# Patient Record
Sex: FEMALE | Race: WHITE | NOT HISPANIC OR LATINO | URBAN - METROPOLITAN AREA
[De-identification: names, ages, dates, MRNs, and addresses within clinical notes are randomized per-mention and may not be internally consistent; named-entity substitution may affect disease eponyms.]

---

## 2018-10-20 ENCOUNTER — HOSPITAL ENCOUNTER (EMERGENCY)
Facility: HOSPITAL | Age: 68
Discharge: LEFT BEFORE DISCHARGE | End: 2018-10-20
Attending: EMERGENCY MEDICINE

## 2018-10-20 VITALS
HEART RATE: 83 BPM | TEMPERATURE: 98.7 F | RESPIRATION RATE: 16 BRPM | WEIGHT: 100 LBS | SYSTOLIC BLOOD PRESSURE: 145 MMHG | OXYGEN SATURATION: 98 % | DIASTOLIC BLOOD PRESSURE: 95 MMHG

## 2018-10-20 DIAGNOSIS — L03.211 FACIAL CELLULITIS: Primary | ICD-10-CM

## 2018-10-20 PROCEDURE — 99283 EMERGENCY DEPT VISIT LOW MDM: CPT

## 2018-10-20 RX ORDER — CLINDAMYCIN HYDROCHLORIDE 150 MG/1
300 CAPSULE ORAL 4 TIMES DAILY
Qty: 80 CAPSULE | Refills: 0 | Status: SHIPPED | OUTPATIENT
Start: 2018-10-20 | End: 2018-10-30

## 2018-10-20 NOTE — DISCHARGE INSTRUCTIONS
Cellulitis   AMBULATORY CARE:   Cellulitis  is a skin infection caused by bacteria  Cellulitis usually appears on the legs and feet, arms and hands, or face  Common signs and symptoms include the following:   · A red, warm, swollen area on your skin    · Pain when the area is touched    · Bumps or blisters (abscess) that may drain pus    · Bumpy, raised skin that feels like an orange peel  Call 911 if:   · You have sudden trouble breathing or chest pain  Seek care immediately if:   · Your wound gets larger and more painful  · You feel a crackling under your skin when you touch it  · You have purple dots or bumps on your skin, or you see bleeding under your skin  · You have new swelling and pain in your legs  · The red, warm, swollen area gets larger  · You see red streaks coming from the infected area  Contact your healthcare provider if:   · You have a fever  · Your fever or pain does not go away or gets worse  · The area does not get smaller after 2 days of antibiotics  · Your skin is flaking or peeling off  · You have questions or concerns about your condition or care  Treatment for cellulitis  may decrease symptoms, stop the infection from spreading, and cure the infection  Treatment depends on how severe your cellulitis is  Cellulitis may go away on its own  You may  instead need antibiotics to help treat the bacterial infection and medicines for pain  Your healthcare provider may draw a [place] around the edges of your cellulitis  If your cellulitis spreads, your healthcare provider will see it outside of the [place]  Manage your symptoms:   · Elevate the area above the level of your heart  as often as you can  This will help decrease swelling and pain  Prop the area on pillows or blankets to keep it elevated comfortably  · Clean the area daily until the wound scabs over  Gently wash the area with soap and water  Pat dry  Use dressings as directed       · Place cool or warm, wet cloths on the area as directed  Use clean cloths and clean water  Leave it on the area until the cloth is room temperature  Pat the area dry with a clean, dry cloth  The cloths may help decrease pain  Prevent cellulitis:   · Do not scratch bug bites or areas of injury  You increase your risk for cellulitis by scratching these areas  · Do not share personal items, such as towels, clothing, and razors  · Clean exercise equipment  with germ-killing  before and after you use it  · Wash your hands often  Use soap and water  Wash your hands after you use the bathroom, change a child's diapers, or sneeze  Wash your hands before you prepare or eat food  Use lotion to prevent dry, cracked skin  · Wear pressure stockings as directed  You may be told to wear the stockings if you have peripheral edema  The stockings improve blood flow and decrease swelling  · Treat athlete's foot  This can help prevent the spread of a bacterial skin infection  Follow up with your healthcare provider within 3 days, or as directed: Your healthcare provider will check if your cellulitis is getting better  You may need different medicine  Write down your questions so you remember to ask them during your visits  © 2017 [employer]  Information is for End User's use only and may not be sold, redistributed or otherwise used for commercial purposes  All illustrations and images included in CareNotes® are the copyrighted property of [employer]  or [place]  The above information is an  only  It is not intended as medical advice for individual conditions or treatments  Talk to your doctor, nurse or pharmacist before following any medical regimen to see if it is safe and effective for you

## 2018-10-20 NOTE — ED PROVIDER NOTES
History  Chief Complaint   Patient presents with    Facial Swelling     is under the care of dentist, on zithromax, had a root canal     The patient is a 78-year-old female who presents for evaluation facial swelling  The patient states that she had a root canal done due to having a dental abscess per her dentist on Monday  Khang Segun informed her that she had a lot of pus removed at that time  She did feel good initially afterwards however noted some swelling returning on Wednesday of this week  She presents today with persistent swelling and some facial pain along the left side cheek bone  She does report that she is allergic to all antibiotics except Zithromax  She has been taking this for the last week and it does not seem to be working  The dentist wanted start the patient on clindamycin however the patient was unsure if she was allergic to this medication  She denies any fevers or chills  She does report that she is rather anxious due to not having a cigarette and she is also noting that she is hypertensive due to having six cups of coffee before coming to the emergency room  She denies any respiratory distress or blurry vision  History provided by:  Patient      None       Past Medical History:   Diagnosis Date    Hiatal hernia        Past Surgical History:   Procedure Laterality Date     SECTION      HERNIA REPAIR         History reviewed  No pertinent family history  I have reviewed and agree with the history as documented  Social History   Substance Use Topics    Smoking status: Current Every Day Smoker     Packs/day: 1 00    Smokeless tobacco: Never Used    Alcohol use No        Review of Systems   Constitutional: Negative for activity change, appetite change and fatigue  HENT: Negative for nosebleeds, sneezing, sore throat, trouble swallowing and voice change  Eyes: Negative for photophobia, pain and visual disturbance     Respiratory: Negative for apnea, choking and stridor  Cardiovascular: Negative for palpitations and leg swelling  Gastrointestinal: Negative for anal bleeding and constipation  Endocrine: Negative for cold intolerance, heat intolerance, polydipsia and polyphagia  Genitourinary: Negative for decreased urine volume, enuresis, frequency, genital sores and urgency  Musculoskeletal: Negative for joint swelling and myalgias  Allergic/Immunologic: Negative for environmental allergies and food allergies  Neurological: Negative for tremors, seizures, speech difficulty and weakness  Hematological: Negative for adenopathy  Psychiatric/Behavioral: Negative for behavioral problems, decreased concentration, dysphoric mood and hallucinations  Physical Exam  Physical Exam   Constitutional: She is oriented to person, place, and time  She appears well-developed and well-nourished  No distress  HENT:   Head: Normocephalic and atraumatic  Right Ear: External ear normal    Left Ear: External ear normal    Nose: Nose normal    Mouth/Throat: Oropharynx is clear and moist    Eyes: Pupils are equal, round, and reactive to light  Conjunctivae and EOM are normal    Neck: Normal range of motion  Neck supple  Cardiovascular: Normal rate, regular rhythm and normal heart sounds  Exam reveals no gallop and no friction rub  No murmur heard  Pulmonary/Chest: Effort normal and breath sounds normal  No respiratory distress  She has no wheezes  Abdominal: Soft  Bowel sounds are normal    Neurological: She is alert and oriented to person, place, and time  No cranial nerve deficit  Skin: Skin is warm and dry  She is not diaphoretic  Psychiatric: She has a normal mood and affect  Her behavior is normal    Vitals reviewed        Vital Signs  ED Triage Vitals [10/20/18 0915]   Temperature Pulse Respirations Blood Pressure SpO2   98 7 °F (37 1 °C) 83 16 145/95 98 %      Temp Source Heart Rate Source Patient Position - Orthostatic VS BP Location FiO2 (%) Tympanic Monitor Sitting Right arm --      Pain Score       5           Vitals:    10/20/18 0915   BP: 145/95   Pulse: 83   Patient Position - Orthostatic VS: Sitting       Visual Acuity      ED Medications  Medications - No data to display    Diagnostic Studies  Results Reviewed     None                 No orders to display              Procedures  Procedures       Phone Contacts  ED Phone Contact    ED Course                               MDM  Number of Diagnoses or Management Options  Facial cellulitis:   Diagnosis management comments: The patient was recommended to have blood work and a scan of her face  The patient adamantly refuses any lab work and scans  She states that she does not want to pay for these tests  I reinforced to her that the infection could worsen and could kill her and she still refuses the scans and lab work  She does report that she will take an antibiotic  I offered her clindamycin of which she is unsure she is allergic to and states that she would like to take this because she believes that the Zithromax is not working any longer  She does report to me that she has an appointment on Tuesday to see her dentist and will keep this appointment  I encouraged her to return to the emergency room with any worsening of her symptoms and she expressed verbal understanding  She agrees to sign AMA form      CritCare Time    Disposition  Final diagnoses:   Facial cellulitis     Time reflects when diagnosis was documented in both MDM as applicable and the Disposition within this note     Time User Action Codes Description Comment    10/20/2018  9:51 AM Francis Bergman Add [[id number]] Facial cellulitis       ED Disposition     ED Disposition Condition Comment    AMA  Date: 10/20/2018  Patient: Fabián Cruz  Admitted: 10/20/2018  9:13 AM  Attending Provider: José Manuel Khanna MD    Fabián Cruz or her authorized caregiver has made the decision for the patient to leave the emergency department against the advice o f the emergency department staff  She or her authorized caregiver has been informed and understands the inherent risks, including death, worsening of infection  She or her authorized caregiver has decided to accept the responsibility for this decision  Jody Sandoval and all necessary parties have been advised that she may return for further evaluation or treatment  Her condition at time of discharge was stable  Jody Sandoval had current vital signs as follows:  /95 (BP Location: Right arm)    Pulse 83   Temp 98 7 °F (37 1 °C) (Tympanic)   Resp 16   Wt 45 4 kg (100 lb)         Follow-up Information     Follow up With Specialties Details Why Contact Info    Gianni Dodge DDS Oral Maxillofacial Surgery Schedule an appointment as soon as possible for a visit  Teresa Sauera [address]            Patient's Medications   Discharge Prescriptions    CLINDAMYCIN (CLEOCIN) 150 MG CAPSULE    Take 2 capsules (300 mg total) by mouth 4 (four) times a day for 10 days       Start Date: 10/20/2018End Date: 10/30/2018       Order Dose: 300 mg       Quantity: 80 capsule    Refills: 0     No discharge procedures on file      ED Provider  Electronically Signed by           Yudith Seymour PA-C  10/20/18 7859

## 2022-11-21 ENCOUNTER — NURSE TRIAGE (OUTPATIENT)
Dept: OTHER | Facility: OTHER | Age: 72
End: 2022-11-21

## 2022-11-22 ENCOUNTER — OFFICE VISIT (OUTPATIENT)
Dept: INTERNAL MEDICINE CLINIC | Facility: CLINIC | Age: 72
End: 2022-11-22

## 2022-11-22 VITALS
BODY MASS INDEX: 18.35 KG/M2 | WEIGHT: 91 LBS | SYSTOLIC BLOOD PRESSURE: 130 MMHG | HEIGHT: 59 IN | RESPIRATION RATE: 16 BRPM | OXYGEN SATURATION: 97 % | DIASTOLIC BLOOD PRESSURE: 80 MMHG | TEMPERATURE: 97.6 F | HEART RATE: 84 BPM

## 2022-11-22 DIAGNOSIS — M17.0 PRIMARY OSTEOARTHRITIS OF BOTH KNEES: ICD-10-CM

## 2022-11-22 DIAGNOSIS — J20.9 BRONCHITIS WITH ASTHMA, ACUTE: Primary | ICD-10-CM

## 2022-11-22 DIAGNOSIS — J45.909 BRONCHITIS WITH ASTHMA, ACUTE: Primary | ICD-10-CM

## 2022-11-22 DIAGNOSIS — H65.113 ACUTE MUCOID OTITIS MEDIA OF BOTH EARS: Primary | ICD-10-CM

## 2022-11-22 DIAGNOSIS — J20.9 ACUTE INFECTIVE TRACHEOBRONCHITIS: ICD-10-CM

## 2022-11-22 DIAGNOSIS — F17.200 SMOKING: ICD-10-CM

## 2022-11-22 DIAGNOSIS — E44.1 MILD PROTEIN-CALORIE MALNUTRITION (HCC): Primary | ICD-10-CM

## 2022-11-22 PROBLEM — IMO0001 SMOKING: Status: ACTIVE | Noted: 2022-11-22

## 2022-11-22 RX ORDER — AZITHROMYCIN 250 MG/1
250 TABLET, FILM COATED ORAL EVERY 24 HOURS
COMMUNITY
End: 2022-11-22 | Stop reason: SDUPTHER

## 2022-11-22 RX ORDER — AZITHROMYCIN 250 MG/1
TABLET, FILM COATED ORAL
Qty: 6 TABLET | Refills: 0 | Status: SHIPPED | OUTPATIENT
Start: 2022-11-22 | End: 2022-11-26

## 2022-11-22 RX ORDER — AZITHROMYCIN 250 MG/1
TABLET, FILM COATED ORAL
Qty: 6 TABLET | Refills: 0 | Status: SHIPPED | OUTPATIENT
Start: 2022-11-22 | End: 2022-11-27

## 2022-11-22 NOTE — TELEPHONE ENCOUNTER
Reason for Disposition  • Cough    Answer Assessment - Initial Assessment Questions  1  ONSET: "When did the cough begin?"       yesterday  2  SEVERITY: "How bad is the cough today?"       Mild-moderate  3  SPUTUM: "Describe the color of your sputum" (none, dry cough; clear, white, yellow, green)      Tint of yellow  5  DIFFICULTY BREATHING: "Are you having difficulty breathing?" If Yes, ask: "How bad is it?" (e g , mild, moderate, severe)     - MILD: No SOB at rest, mild SOB with walking, speaks normally in sentences, can lay down, no retractions, pulse < 100      - MODERATE: SOB at rest, SOB with minimal exertion and prefers to sit, cannot lie down flat, speaks in phrases, mild retractions, audible wheezing, pulse 100-120      - SEVERE: Very SOB at rest, speaks in single words, struggling to breathe, sitting hunched forward, retractions, pulse > 120       denies  6  FEVER: "Do you have a fever?" If Yes, ask: "What is your temperature, how was it measured, and when did it start?"      denies  10   OTHER SYMPTOMS: "Do you have any other symptoms?" (e g , runny nose, wheezing, chest pain)        denies    Protocols used: COUGH - ACUTE PRODUCTIVE-ADULT-

## 2022-11-22 NOTE — TELEPHONE ENCOUNTER
Regarding: Cough/ No Temperature  ----- Message from Cedric 64 sent at 11/21/2022  7:18 PM EST -----  "My friend has bronchitis and I was exposed  I think I have bronchitis   I am coughing and I never do, I don't have a temperature"

## 2022-11-22 NOTE — PATIENT INSTRUCTIONS
Acute Cough   WHAT YOU NEED TO KNOW:   An acute cough can last up to 3 weeks  Common causes of an acute cough include a cold, allergies, or a lung infection  DISCHARGE INSTRUCTIONS:   Return to the emergency department if:   You have trouble breathing or feel short of breath  You cough up blood, or you see blood in your mucus  You faint or feel weak or dizzy  You have chest pain when you cough or take a deep breath  You have new wheezing  Contact your healthcare provider if:   You have a fever  Your cough lasts longer than 4 weeks  Your symptoms do not improve with treatment  You have questions or concerns about your condition or care  Medicines:   Medicines  may be needed to stop the cough, decrease swelling in your airways, or help open your airways  Medicine may also be given to help you cough up mucus  Ask your healthcare provider what over-the-counter medicines you can take  If you have an infection caused by bacteria, you may need antibiotics  Take your medicine as directed  Contact your healthcare provider if you think your medicine is not helping or if you have side effects  Tell him or her if you are allergic to any medicine  Keep a list of the medicines, vitamins, and herbs you take  Include the amounts, and when and why you take them  Bring the list or the pill bottles to follow-up visits  Carry your medicine list with you in case of an emergency  Manage your symptoms:   Do not smoke and stay away from others who smoke  Nicotine and other chemicals in cigarettes and cigars can cause lung damage and make your cough worse  Ask your healthcare provider for information if you currently smoke and need help to quit  E-cigarettes or smokeless tobacco still contain nicotine  Talk to your healthcare provider before you use these products  Drink extra liquids as directed  Liquids will help thin and loosen mucus so you can cough it up   Liquids will also help prevent dehydration  Examples of good liquids to drink include water, fruit juice, and broth  Do not drink liquids that contain caffeine  Caffeine can increase your risk for dehydration  Ask your healthcare provider how much liquid to drink each day  Rest as directed  Do not do activities that make your cough worse, such as exercise  Use a humidifier or vaporizer  Use a cool mist humidifier or a vaporizer to increase air moisture in your home  This may make it easier for you to breathe and help decrease your cough  Eat 2 to 5 mL of honey 2 times each day  Honey can help thin mucus and decrease your cough  Use cough drops or lozenges  These can help decrease throat irritation and your cough  Follow up with your healthcare provider as directed:  Write down your questions so you remember to ask them during your visits  © Copyright [employer] 2022 Information is for End User's use only and may not be sold, redistributed or otherwise used for commercial purposes  All illustrations and images included in CareNotes® are the copyrighted property of [employer]  or [address] [place], [place] [address]norman Amos   The above information is an  only  It is not intended as medical advice for individual conditions or treatments  Talk to your doctor, nurse or pharmacist before following any medical regimen to see if it is safe and effective for you

## 2022-11-22 NOTE — PROGRESS NOTES
Dr Víctor Yang Office Visit Note  22     Nadiya Pickard 74 y o  female MRN: [id number]  : 1950    Assessment:     1  Mild protein-calorie malnutrition ([address] )  Assessment & Plan:  Malnutrition Findings:  18 3 BMI kg/m2  counseling done for to gain weight nutritional supplement and consult with nutritionist patient refused                                BMI Findings: Body mass index is 18 38 kg/m²  2  Acute infective tracheobronchitis  Assessment & Plan:  Coughing congested the smoke half a pack per day treated with Z-Silvino and over-the-counter cough medicine      3  Primary osteoarthritis of both knees  Assessment & Plan:  Use Tylenol or ibuprofen over-the-counter symptom controlled      4  Smoking  Assessment & Plan:  Counseling done for smoking cessation            Discussion Summary and Plan: Today's care plan and medications were reviewed with patient in detail and all their questions answered to their satisfaction  No chief complaint on file  Subjective:  Came in complaining coughing scanty yellowish expect duration denies any fever chills difficulty breathing patient does smoke half a pack per day noncompliant refuse a colonoscopy mammogram blood work DEXA scan     BMI Counseling: Body mass index is 18 38 kg/m²  The BMI is below normal  Patient advised to gain weight and dietary education for weight gain was provided  Patient referred to nutritionist  Rationale for BMI follow-up plan is due to patient being underweight  The following portions of the patient's history were reviewed and updated as appropriate: allergies, current medications, past family history, past medical history, past social history, past surgical history and problem list     Review of Systems   Constitutional: Positive for activity change and fatigue  Negative for appetite change, chills, diaphoresis, fever and unexpected weight change     HENT: Negative for congestion, dental problem, drooling, ear discharge, ear pain, facial swelling, hearing loss, mouth sores, nosebleeds, postnasal drip, rhinorrhea, sinus pressure, sneezing, sore throat, tinnitus, trouble swallowing and voice change  Eyes: Negative for photophobia, pain, discharge, redness, itching and visual disturbance  Respiratory: Positive for cough  Negative for apnea, choking, chest tightness, shortness of breath, wheezing and stridor  Cardiovascular: Negative for chest pain, palpitations and leg swelling  Gastrointestinal: Negative for abdominal distention, abdominal pain, anal bleeding, blood in stool, constipation, diarrhea, nausea, rectal pain and vomiting  Endocrine: Negative for cold intolerance, heat intolerance, polydipsia, polyphagia and polyuria  Genitourinary: Negative for decreased urine volume, difficulty urinating, dysuria, enuresis, flank pain, frequency, genital sores, hematuria and urgency  Musculoskeletal: Negative for arthralgias, back pain, gait problem, joint swelling, myalgias, neck pain and neck stiffness  Skin: Negative for color change, pallor, rash and wound  Allergic/Immunologic: Negative  Negative for environmental allergies, food allergies and immunocompromised state  Neurological: Negative for dizziness, tremors, seizures, syncope, facial asymmetry, speech difficulty, weakness, light-headedness, numbness and headaches  Psychiatric/Behavioral: Negative for agitation, behavioral problems, confusion, decreased concentration, dysphoric mood, hallucinations, self-injury, sleep disturbance and suicidal ideas  The patient is not nervous/anxious and is not hyperactive            Historical Information   Patient Active Problem List   Diagnosis   • Acute infective tracheobronchitis   • Smoking   • Primary osteoarthritis of both knees   • Mild protein-calorie malnutrition ([facility] [address] )     Past Medical History:   Diagnosis Date   • Hiatal hernia      Past Surgical History:   Procedure Laterality Date   •  SECTION     • HERNIA REPAIR       Social History     Substance and Sexual Activity   Alcohol Use No     Social History     Substance and Sexual Activity   Drug Use No     Social History     Tobacco Use   Smoking Status Every Day   • Packs/day: 1 00   • Types: Cigarettes   Smokeless Tobacco Never     History reviewed  No pertinent family history  Health Maintenance Due   Topic   • Hepatitis C Screening    • Hepatitis B Vaccine (1 of 3 - 3-dose series)   • COVID-19 Vaccine (1)   • Pneumococcal Vaccine: 65+ Years (1 - PCV)   • Depression Screening    • BMI: Followup Plan    • Annual Physical    • DTaP,Tdap,and Td Vaccines (1 - Tdap)   • Breast Cancer Screening: Mammogram    • Colorectal Cancer Screening    • Osteoporosis Screening    • Fall Risk    • Urinary Incontinence Screening    • Influenza Vaccine (1)      Meds/Allergies       Current Outpatient Medications:   •  azithromycin (ZITHROMAX) 250 mg tablet, Take two tablets the first day then take one daily for 4 more days, Disp: 6 tablet, Rfl: 0  •  azithromycin (Zithromax) 250 mg tablet, Take 2 tablets (500 mg total) by mouth daily for 1 day, THEN 1 tablet (250 mg total) daily for 4 days  , Disp: 6 tablet, Rfl: 0      Objective:    Vitals:   /80   Pulse 84   Temp 97 6 °F (36 4 °C)   Resp 16   Ht 4' 11" (1 499 m)   Wt 41 3 kg (91 lb)   SpO2 97%   BMI 18 38 kg/m²   Body mass index is 18 38 kg/m²  Vitals:    11/22/22 1406   Weight: 41 3 kg (91 lb)       Physical Exam  Constitutional:       General: She is not in acute distress  Appearance: She is well-developed and well-nourished  She is not ill-appearing, toxic-appearing, sickly-appearing or diaphoretic  Comments: Malnourished   HENT:      Head: Normocephalic and atraumatic  Right Ear: External ear normal       Left Ear: External ear normal       Nose: Nose normal       Mouth/Throat:      Mouth: Oropharynx is clear and moist       Pharynx: No oropharyngeal exudate     Eyes:      General: Lids are normal  Lids are everted, no foreign bodies appreciated  No scleral icterus  Right eye: No discharge  Left eye: No discharge  Extraocular Movements: EOM normal       Conjunctiva/sclera: Conjunctivae normal       Pupils: Pupils are equal, round, and reactive to light  Neck:      Thyroid: No thyromegaly  Vascular: Normal carotid pulses  No carotid bruit, hepatojugular reflux or JVD  Trachea: No tracheal tenderness or tracheal deviation  Cardiovascular:      Rate and Rhythm: Normal rate and regular rhythm  Pulses: Normal pulses and intact distal pulses  Heart sounds: Normal heart sounds  No murmur heard  No friction rub  No gallop  Pulmonary:      Effort: Pulmonary effort is normal  No respiratory distress  Breath sounds: No stridor  Rhonchi and rales present  No wheezing  Chest:      Chest wall: No tenderness  Abdominal:      General: Bowel sounds are normal  There is no distension or ascites  Palpations: Abdomen is soft  There is no mass  Tenderness: There is no abdominal tenderness  There is no guarding or rebound  Musculoskeletal:         General: No tenderness, deformity or edema  Normal range of motion  Cervical back: Normal range of motion and neck supple  No edema, erythema or rigidity  No spinous process tenderness or muscular tenderness  Normal range of motion  Lymphadenopathy:      Head:      Right side of head: No submental, submandibular, tonsillar, preauricular or posterior auricular adenopathy  Left side of head: No submental, submandibular, tonsillar, preauricular, posterior auricular or occipital adenopathy  Cervical: No cervical adenopathy  Right cervical: No superficial, deep or posterior cervical adenopathy  Left cervical: No superficial, deep or posterior cervical adenopathy  Upper Body:      Right upper body: No pectoral or lateral adenopathy        Left upper body: No pectoral or lateral adenopathy  Skin:     General: Skin is warm and dry  Coloration: Skin is not pale  Findings: No erythema or rash  Neurological:      Mental Status: She is alert and oriented to person, place, and time  Cranial Nerves: No cranial nerve deficit  Sensory: No sensory deficit  Motor: Motor strength is normal  Weakness present  No tremor, abnormal muscle tone or seizure activity  Coordination: She displays a negative Romberg sign  Coordination abnormal       Gait: Gait normal       Deep Tendon Reflexes: Reflexes are normal and symmetric  Reflexes normal    Psychiatric:         Mood and Affect: Mood and affect normal          Behavior: Behavior normal          Thought Content: Thought content normal          Judgment: Judgment normal          Lab Review   No visits with results within 2 Month(s) from this visit  Latest known visit with results is:   No results found for any previous visit  Patient Instructions   Acute Cough   WHAT YOU NEED TO KNOW:   An acute cough can last up to 3 weeks  Common causes of an acute cough include a cold, allergies, or a lung infection  DISCHARGE INSTRUCTIONS:   Return to the emergency department if:   · You have trouble breathing or feel short of breath  · You cough up blood, or you see blood in your mucus  · You faint or feel weak or dizzy  · You have chest pain when you cough or take a deep breath  · You have new wheezing  Contact your healthcare provider if:   · You have a fever  · Your cough lasts longer than 4 weeks  · Your symptoms do not improve with treatment  · You have questions or concerns about your condition or care  Medicines:   · Medicines  may be needed to stop the cough, decrease swelling in your airways, or help open your airways  Medicine may also be given to help you cough up mucus  Ask your healthcare provider what over-the-counter medicines you can take   If you have an infection caused by bacteria, you may need antibiotics  · Take your medicine as directed  Contact your healthcare provider if you think your medicine is not helping or if you have side effects  Tell him or her if you are allergic to any medicine  Keep a list of the medicines, vitamins, and herbs you take  Include the amounts, and when and why you take them  Bring the list or the pill bottles to follow-up visits  Carry your medicine list with you in case of an emergency  Manage your symptoms:   · Do not smoke and stay away from others who smoke  Nicotine and other chemicals in cigarettes and cigars can cause lung damage and make your cough worse  Ask your healthcare provider for information if you currently smoke and need help to quit  E-cigarettes or smokeless tobacco still contain nicotine  Talk to your healthcare provider before you use these products  · Drink extra liquids as directed  Liquids will help thin and loosen mucus so you can cough it up  Liquids will also help prevent dehydration  Examples of good liquids to drink include water, fruit juice, and broth  Do not drink liquids that contain caffeine  Caffeine can increase your risk for dehydration  Ask your healthcare provider how much liquid to drink each day  · Rest as directed  Do not do activities that make your cough worse, such as exercise  · Use a humidifier or vaporizer  Use a cool mist humidifier or a vaporizer to increase air moisture in your home  This may make it easier for you to breathe and help decrease your cough  · Eat 2 to 5 mL of honey 2 times each day  Honey can help thin mucus and decrease your cough  · Use cough drops or lozenges  These can help decrease throat irritation and your cough  Follow up with your healthcare provider as directed:  Write down your questions so you remember to ask them during your visits    © Copyright [employer] 2022 Information is for End User's use only and may not be sold, redistributed or otherwise used for commercial purposes  All illustrations and images included in CareNotes® are the copyrighted property of A D A M , Inc  or [facility] Maia Amos   The above information is an  only  It is not intended as medical advice for individual conditions or treatments  Talk to your doctor, nurse or pharmacist before following any medical regimen to see if it is safe and effective for you  Nena Mcbride MD        "This note has been constructed using a voice recognition system  Therefore there may be syntax, spelling, and/or grammatical errors   Please call if you have any questions  "

## 2022-11-22 NOTE — ASSESSMENT & PLAN NOTE
Malnutrition Findings:  18 3 BMI kg/m2  counseling done for to gain weight nutritional supplement and consult with nutritionist patient refused                                BMI Findings: Body mass index is 18 38 kg/m²

## 2022-11-22 NOTE — ASSESSMENT & PLAN NOTE
Coughing congested the smoke half a pack per day treated with Z-Silvino and over-the-counter cough medicine

## 2023-01-21 PROBLEM — J20.9 ACUTE INFECTIVE TRACHEOBRONCHITIS: Status: RESOLVED | Noted: 2022-11-22 | Resolved: 2023-01-21

## 2023-09-22 ENCOUNTER — NURSE TRIAGE (OUTPATIENT)
Dept: OTHER | Facility: OTHER | Age: 73
End: 2023-09-22

## 2023-09-22 ENCOUNTER — HOSPITAL ENCOUNTER (EMERGENCY)
Facility: HOSPITAL | Age: 73
Discharge: HOME | End: 2023-09-22
Attending: EMERGENCY MEDICINE
Payer: COMMERCIAL

## 2023-09-22 VITALS
TEMPERATURE: 96.9 F | WEIGHT: 91 LBS | OXYGEN SATURATION: 96 % | SYSTOLIC BLOOD PRESSURE: 187 MMHG | HEIGHT: 59 IN | RESPIRATION RATE: 18 BRPM | DIASTOLIC BLOOD PRESSURE: 88 MMHG | BODY MASS INDEX: 18.35 KG/M2 | HEART RATE: 64 BPM

## 2023-09-22 DIAGNOSIS — T07.XXXA MULTIPLE LACERATIONS: ICD-10-CM
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
## 2023-09-22 PROCEDURE — 99283 EMERGENCY DEPT VISIT LOW MDM: CPT

## 2023-09-22 PROCEDURE — 12004 RPR S/N/AX/GEN/TRK7.6-12.5CM: CPT | Performed by: EMERGENCY MEDICINE

## 2023-09-22 PROCEDURE — 99284 EMERGENCY DEPT VISIT MOD MDM: CPT | Performed by: EMERGENCY MEDICINE

## 2023-09-22 RX ORDER — GINSENG 100 MG
1 CAPSULE ORAL ONCE
Status: DISCONTINUED | OUTPATIENT
Start: 2023-09-22 | End: 2023-09-22 | Stop reason: HOSPADM

## 2023-09-22 RX ORDER — MOXIFLOXACIN HYDROCHLORIDE 400 MG/1
400 TABLET ORAL DAILY
Qty: 7 TABLET | Refills: 0 | Status: SHIPPED | OUTPATIENT
Start: 2023-09-22 | End: 2023-09-30

## 2023-09-22 RX ORDER — LIDOCAINE HYDROCHLORIDE AND EPINEPHRINE 10; 10 MG/ML; UG/ML
10 INJECTION, SOLUTION INFILTRATION; PERINEURAL ONCE
Status: DISCONTINUED | OUTPATIENT
Start: 2023-09-22 | End: 2023-09-22 | Stop reason: HOSPADM

## 2023-09-22 NOTE — ED NOTES
Pt evaluation attempted by MD.  Pt on cell phone and told MD to come back.        Isauro Watkins RN  09/22/23 1419

## 2023-09-22 NOTE — ED PROVIDER NOTES
History  Chief Complaint   Patient presents with   • Dog Bite     Pt reports a friend of a friend's dog jumped up and bit her in the arms bilaterally. Pt arrived with bandages to bilat forearms. PT denies any knowledge of the owners info or the breed of dog. Patient presents for evaluation after dog bite. Reports a friend of her friend's dog jumped up and bit her on the arms bilaterally. Denies any head injury or loss of consciousness. Asked about tetanus patient states she is allergic and does not get any vaccinations. Discussed rabies vaccinations this is a known dog but she once again is against any vaccinations. History provided by:  Patient   used: No    Dog Bite      None       Past Medical History:   Diagnosis Date   • Hiatal hernia        Past Surgical History:   Procedure Laterality Date   •  SECTION     • HERNIA REPAIR         History reviewed. No pertinent family history. I have reviewed and agree with the history as documented. E-Cigarette/Vaping   • E-Cigarette Use Never User      E-Cigarette/Vaping Substances     Social History     Tobacco Use   • Smoking status: Every Day     Packs/day: 3.00     Types: Cigarettes   • Smokeless tobacco: Never   Vaping Use   • Vaping Use: Never used   Substance Use Topics   • Alcohol use: No   • Drug use: No       Review of Systems   Skin: Positive for wound. All other systems reviewed and are negative. Physical Exam  Physical Exam  Vitals and nursing note reviewed. Constitutional:       General: She is not in acute distress. Skin:         Neurological:      General: No focal deficit present. Mental Status: She is alert and oriented to person, place, and time.          Vital Signs  ED Triage Vitals   Temperature Pulse Respirations Blood Pressure SpO2   23 1447 23 1447 23 1447 23 1447 23 1447   (!) 96.9 °F (36.1 °C) 64 18 (!) 187/88 96 %      Temp Source Heart Rate Source Patient Position - Orthostatic VS BP Location FiO2 (%)   09/22/23 1447 09/22/23 1447 -- -- --   Tympanic Monitor         Pain Score       09/22/23 1504       5           Vitals:    09/22/23 1447   BP: (!) 187/88   Pulse: 64         Visual Acuity      ED Medications  Medications   lidocaine-epinephrine (XYLOCAINE/EPINEPHRINE) 1 %-1:100,000 injection 10 mL (has no administration in time range)       Diagnostic Studies  Results Reviewed     None                 No orders to display              Procedures  Universal Protocol:  Consent: Verbal consent obtained. Consent given by: patient  Patient identity confirmed: verbally with patient and arm band    Laceration repair    Date/Time: 9/22/2023 5:38 PM    Performed by: Thiago Nixon DO  Authorized by: Thiago Nixon DO  Body area: upper extremity  Location details: left lower arm  Laceration length: 5 cm  Anesthesia: local infiltration    Anesthesia:  Local Anesthetic: lidocaine 1% with epinephrine      Procedure Details:  Preparation: Patient was prepped and draped in the usual sterile fashion. Irrigation solution: saline  Irrigation method: syringe  Amount of cleaning: extensive  Skin closure: 4-0 nylon  Number of sutures: 10  Technique: simple  Approximation: close  Approximation difficulty: complex  Dressing: antibiotic ointment  Patient tolerance: patient tolerated the procedure well with no immediate complications    Universal Protocol:  Consent: Verbal consent obtained. Consent given by: patient  Patient identity confirmed: verbally with patient and arm band    Laceration repair    Date/Time: 9/22/2023 5:38 PM    Performed by: Thiago Nixon DO  Authorized by: Thiago Nixon DO  Body area: upper extremity  Location details: right lower arm  Laceration length: 4 cm  Anesthesia: local infiltration    Anesthesia:  Local Anesthetic: lidocaine 1% with epinephrine      Procedure Details:  Preparation: Patient was prepped and draped in the usual sterile fashion.   Irrigation solution: saline  Irrigation method: syringe  Amount of cleaning: extensive  Skin closure: 4-0 nylon  Number of sutures: 7  Technique: simple  Approximation: close  Approximation difficulty: complex  Dressing: antibiotic ointment  Patient tolerance: patient tolerated the procedure well with no immediate complications    Universal Protocol:  Consent: Verbal consent obtained. Consent given by: patient  Patient identity confirmed: verbally with patient and arm band    Laceration repair    Date/Time: 9/22/2023 5:39 PM    Performed by: Cathi Cabrera DO  Authorized by: Cathi Cabrera DO  Body area: upper extremity  Location details: right lower arm  Laceration length: 3.5 cm  Anesthesia: local infiltration    Anesthesia:  Local Anesthetic: lidocaine 1% with epinephrine      Procedure Details:  Preparation: Patient was prepped and draped in the usual sterile fashion. Irrigation solution: saline  Irrigation method: syringe  Amount of cleaning: extensive  Skin closure: 4-0 nylon  Number of sutures: 5  Technique: simple  Approximation: close  Approximation difficulty: simple  Dressing: antibiotic ointment  Patient tolerance: patient tolerated the procedure well with no immediate complications    Universal Protocol:  Consent: Verbal consent obtained. Consent given by: patient  Patient identity confirmed: verbally with patient and arm band    Laceration repair    Date/Time: 9/22/2023 5:40 PM    Performed by: Cathi Cabrera DO  Authorized by: Cathi Cabrera DO  Body area: upper extremity  Location details: right lower arm  Laceration length: 1.5 cm  Anesthesia: local infiltration    Anesthesia:  Local Anesthetic: lidocaine 1% with epinephrine      Procedure Details:  Preparation: Patient was prepped and draped in the usual sterile fashion.   Irrigation solution: saline  Irrigation method: syringe  Amount of cleaning: extensive  Skin closure: 4-0 nylon  Number of sutures: 2  Technique: simple  Approximation: close  Approximation difficulty: simple  Dressing: antibiotic ointment  Patient tolerance: patient tolerated the procedure well with no immediate complications               ED Course  ED Course as of 09/24/23 1850   Fri Sep 22, 2023   1450 Saw patient on arrival, talking on phone. Waited and advised the patient like to speak with her and evaluate her wounds. Said just a minute and kept talking on her phone. 1754 Patient wanted to take her own Tylenol from bag for pain. SBIRT 20yo+    Flowsheet Row Most Recent Value   Initial Alcohol Screen: US AUDIT-C     1. How often do you have a drink containing alcohol? 0 Filed at: 09/22/2023 1510   3a. Male UNDER 65: How often do you have five or more drinks on one occasion? 0 Filed at: 09/22/2023 1510   Audit-C Score 0 Filed at: 09/22/2023 1510   DESIRAE: How many times in the past year have you. .. Used an illegal drug or used a prescription medication for non-medical reasons? Never Filed at: 09/22/2023 1510                    Medical Decision Making  Pulse ox 96% on room air indicating adequate oxygenation. Dog bite, initial encounter: acute illness or injury  Multiple lacerations: acute illness or injury  Risk  OTC drugs. Prescription drug management. Disposition  Final diagnoses:   Dog bite, initial encounter   Multiple lacerations     Time reflects when diagnosis was documented in both MDM as applicable and the Disposition within this note     Time User Action Codes Description Comment    9/22/2023  4:16 PM David [email or web address]. 0XXA] Dog bite, initial encounter     9/22/2023  4:16 PM Power, [facility]. XXXA] Multiple lacerations       ED Disposition     None      Follow-up Information    None         Patient's Medications   Discharge Prescriptions    MOXIFLOXACIN (AVELOX) 400 MG TABLET    Take 1 tablet (400 mg total) by mouth daily for 7 days       Start Date: 9/22/2023 End Date: 9/29/2023       Order Dose: 400 mg Quantity: 7 tablet    Refills: 0       No discharge procedures on file.     PDMP Review     None          ED Provider  Electronically Signed by           Theresa Roman DO  09/24/23 DO Erika  10/02/23 3179

## 2023-09-23 NOTE — TELEPHONE ENCOUNTER
Reason for Disposition  • Caller has medicine question, adult has minor symptoms, caller declines triage, AND triager answers question    Answer Assessment - Initial Assessment Questions  1. NAME of MEDICATION: "What medicine are you calling about?"      Abx  2. QUESTION: "What is your question?" (e.g., medication refill, side effect)      I want an abx ordered for a dog bite. Protocols used: MEDICATION QUESTION CALL-ADULT-    Pt was just in ED for a dog bite. She was prescribed an abx but states she cant take that abx and needs a different one prescribed. Advised to call ED back and discuss with them.

## 2023-09-23 NOTE — TELEPHONE ENCOUNTER
Regarding: Antibiotic request for dog bite  ----- Message from Kwabena Blanc sent at 9/22/2023  8:47 PM EDT -----  "I was just in the ER for a dog bite. I need an antibiotic and they offered me penicillin.  I can only take Tylenol or the z pack."

## 2023-09-25 ENCOUNTER — TELEPHONE (OUTPATIENT)
Age: 73
End: 2023-09-25

## 2023-09-25 ENCOUNTER — HOSPITAL ENCOUNTER (EMERGENCY)
Facility: HOSPITAL | Age: 73
Discharge: HOME | DRG: 603 | End: 2023-09-25
Attending: EMERGENCY MEDICINE | Admitting: EMERGENCY MEDICINE
Payer: MEDICARE

## 2023-09-25 VITALS
RESPIRATION RATE: 24 BRPM | TEMPERATURE: 99.1 F | DIASTOLIC BLOOD PRESSURE: 88 MMHG | SYSTOLIC BLOOD PRESSURE: 181 MMHG | HEART RATE: 74 BPM | OXYGEN SATURATION: 98 %

## 2023-09-25 DIAGNOSIS — L03.113 CELLULITIS OF RIGHT HAND: ICD-10-CM

## 2023-09-25 DIAGNOSIS — L08.9 WOUND INFECTION: Primary | ICD-10-CM

## 2023-09-25 DIAGNOSIS — T14.8XXA WOUND INFECTION: Primary | ICD-10-CM

## 2023-09-25 LAB
ANION GAP SERPL CALCULATED.3IONS-SCNC: 6 MMOL/L
BASOPHILS # BLD AUTO: 0.04 THOUSANDS/ÂΜL (ref 0–0.1)
BASOPHILS NFR BLD AUTO: 0 % (ref 0–1)
BUN SERPL-MCNC: 14 MG/DL (ref 5–25)
CALCIUM SERPL-MCNC: 9.2 MG/DL (ref 8.4–10.2)
CHLORIDE SERPL-SCNC: 104 MMOL/L (ref 96–108)
CO2 SERPL-SCNC: 26 MMOL/L (ref 21–32)
CREAT SERPL-MCNC: 0.61 MG/DL (ref 0.6–1.3)
EOSINOPHIL # BLD AUTO: 0.08 THOUSAND/ÂΜL (ref 0–0.61)
EOSINOPHIL NFR BLD AUTO: 1 % (ref 0–6)
ERYTHROCYTE [DISTWIDTH] IN BLOOD BY AUTOMATED COUNT: 15 % (ref 11.6–15.1)
GFR SERPL CREATININE-BSD FRML MDRD: 88 ML/MIN/1.73SQ M
GLUCOSE SERPL-MCNC: 114 MG/DL (ref 65–140)
HCT VFR BLD AUTO: 34.9 % (ref 34.8–46.1)
HGB BLD-MCNC: 11 G/DL (ref 11.5–15.4)
IMM GRANULOCYTES # BLD AUTO: 0.04 THOUSAND/UL (ref 0–0.2)
IMM GRANULOCYTES NFR BLD AUTO: 0 % (ref 0–2)
LYMPHOCYTES # BLD AUTO: 1.73 THOUSANDS/ÂΜL (ref 0.6–4.47)
LYMPHOCYTES NFR BLD AUTO: 15 % (ref 14–44)
MCH RBC QN AUTO: 24 PG (ref 26.8–34.3)
MCHC RBC AUTO-ENTMCNC: 31.5 G/DL (ref 31.4–37.4)
MCV RBC AUTO: 76 FL (ref 82–98)
MONOCYTES # BLD AUTO: 0.8 THOUSAND/ÂΜL (ref 0.17–1.22)
MONOCYTES NFR BLD AUTO: 7 % (ref 4–12)
NEUTROPHILS # BLD AUTO: 8.68 THOUSANDS/ÂΜL (ref 1.85–7.62)
NEUTS SEG NFR BLD AUTO: 77 % (ref 43–75)
NRBC BLD AUTO-RTO: 0 /100 WBCS
PLATELET # BLD AUTO: 245 THOUSANDS/UL (ref 149–390)
PMV BLD AUTO: 9.2 FL (ref 8.9–12.7)
POTASSIUM SERPL-SCNC: 3.9 MMOL/L (ref 3.5–5.3)
RBC # BLD AUTO: 4.58 MILLION/UL (ref 3.81–5.12)
SODIUM SERPL-SCNC: 136 MMOL/L (ref 135–147)
WBC # BLD AUTO: 11.37 THOUSAND/UL (ref 4.31–10.16)

## 2023-09-25 PROCEDURE — 87205 SMEAR GRAM STAIN: CPT | Performed by: PHYSICIAN ASSISTANT

## 2023-09-25 PROCEDURE — 99284 EMERGENCY DEPT VISIT MOD MDM: CPT | Performed by: PHYSICIAN ASSISTANT

## 2023-09-25 PROCEDURE — 85025 COMPLETE CBC W/AUTO DIFF WBC: CPT | Performed by: PHYSICIAN ASSISTANT

## 2023-09-25 PROCEDURE — 36415 COLL VENOUS BLD VENIPUNCTURE: CPT | Performed by: PHYSICIAN ASSISTANT

## 2023-09-25 PROCEDURE — 80048 BASIC METABOLIC PNL TOTAL CA: CPT | Performed by: PHYSICIAN ASSISTANT

## 2023-09-25 PROCEDURE — 87070 CULTURE OTHR SPECIMN AEROBIC: CPT | Performed by: PHYSICIAN ASSISTANT

## 2023-09-25 PROCEDURE — 99283 EMERGENCY DEPT VISIT LOW MDM: CPT

## 2023-09-25 PROCEDURE — 87147 CULTURE TYPE IMMUNOLOGIC: CPT | Performed by: PHYSICIAN ASSISTANT

## 2023-09-25 RX ORDER — METRONIDAZOLE 500 MG/1
500 TABLET ORAL EVERY 8 HOURS SCHEDULED
Qty: 30 TABLET | Refills: 0 | Status: SHIPPED | OUTPATIENT
Start: 2023-09-25 | End: 2023-09-30

## 2023-09-25 NOTE — TELEPHONE ENCOUNTER
Patient in hospital after being bit by a dog.  They want patient to have IV antibiotics but patient wants to talk to [facility]

## 2023-09-25 NOTE — ED PROVIDER NOTES
History  Chief Complaint   Patient presents with   • Hand Swelling     C/o pain in both arms after dog bite     75 y/o female presenting for wound check to bilateral forearms, was bit by a dog 3 days ago, taking her moxifloxacin, arrives with both forearms wrapped tightly with tape that she did herself at home yesterday. There is some erythema and warmth extending from the posterior aspect of the right forearm extending down to the hands, does not reach into the fingers. Diffusely tender there is purulent drainage from the distal wound. Discussed with patient the importance of leaving wounds open to air, she states "I cannot do that". Discussed with patient allergy to penicillin listed on chart: she states that she took it 1 time as a child and "I ". Denies fevers. Prior to Admission Medications   Prescriptions Last Dose Informant Patient Reported? Taking?   moxifloxacin (AVELOX) 400 MG tablet   No No   Sig: Take 1 tablet (400 mg total) by mouth daily for 7 days      Facility-Administered Medications: None       Past Medical History:   Diagnosis Date   • Hiatal hernia        Past Surgical History:   Procedure Laterality Date   •  SECTION     • HERNIA REPAIR         History reviewed. No pertinent family history. I have reviewed and agree with the history as documented. E-Cigarette/Vaping   • E-Cigarette Use Never User      E-Cigarette/Vaping Substances     Social History     Tobacco Use   • Smoking status: Every Day     Packs/day: 3.00     Types: Cigarettes   • Smokeless tobacco: Never   Vaping Use   • Vaping Use: Never used   Substance Use Topics   • Alcohol use: No   • Drug use: No       Review of Systems   Constitutional: Negative. Negative for chills, fatigue and fever. HENT: Negative. Negative for congestion, postnasal drip, rhinorrhea and sore throat. Eyes: Negative. Respiratory: Negative. Negative for cough, shortness of breath and wheezing. Cardiovascular: Negative. Gastrointestinal: Negative. Negative for abdominal pain, diarrhea, nausea and vomiting. Endocrine: Negative. Genitourinary: Negative. Musculoskeletal: Negative. Skin: Positive for color change and wound. Negative for pallor and rash. Neurological: Negative. Hematological: Negative. Psychiatric/Behavioral: Negative. All other systems reviewed and are negative. Physical Exam  Physical Exam  Vitals and nursing note reviewed. Constitutional:       Appearance: Normal appearance. HENT:      Head: Normocephalic and atraumatic. Right Ear: External ear normal.      Left Ear: External ear normal.      Nose: Nose normal.   Eyes:      Conjunctiva/sclera: Conjunctivae normal.   Cardiovascular:      Rate and Rhythm: Normal rate. Pulses: Normal pulses. Pulmonary:      Effort: Pulmonary effort is normal.   Abdominal:      General: There is no distension. Musculoskeletal:         General: Swelling and tenderness present. No deformity. Normal range of motion. Cervical back: Normal range of motion. Skin:     General: Skin is warm and dry. Capillary Refill: Capillary refill takes less than 2 seconds. Findings: Erythema present. No rash. Neurological:      General: No focal deficit present. Mental Status: She is alert and oriented to person, place, and time. Mental status is at baseline. Psychiatric:         Mood and Affect: Mood normal.         Behavior: Behavior normal.         Thought Content:  Thought content normal.         Judgment: Judgment normal.         Vital Signs  ED Triage Vitals [09/25/23 1300]   Temperature Pulse Respirations Blood Pressure SpO2   99.1 °F (37.3 °C) 74 (!) 24 (!) 181/88 98 %      Temp Source Heart Rate Source Patient Position - Orthostatic VS BP Location FiO2 (%)   Tympanic Monitor Sitting Left arm --      Pain Score       9           Vitals:    09/25/23 1300   BP: (!) 181/88   Pulse: 74   Patient Position - Orthostatic VS: Sitting         Visual Acuity      ED Medications  Medications - No data to display    Diagnostic Studies  Results Reviewed     Procedure Component Value Units Date/Time    Basic metabolic panel [[id number]] Collected: 09/25/23 1346    Lab Status: Final result Specimen: Blood from Arm, Left Updated: 09/25/23 1411     Sodium 136 mmol/L      Potassium 3.9 mmol/L      Chloride 104 mmol/L      CO2 26 mmol/L      ANION GAP 6 mmol/L      BUN 14 mg/dL      Creatinine 0.61 mg/dL      Glucose 114 mg/dL      Calcium 9.2 mg/dL      eGFR 88 ml/min/1.73sq m     Narrative:      [facility] guidelines for Chronic Kidney Disease (CKD):   •  Stage 1 with normal or high GFR (GFR > 90 mL/min/1.73 square meters)  •  Stage 2 Mild CKD (GFR = 60-89 mL/min/1.73 square meters)  •  Stage 3A Moderate CKD (GFR = 45-59 mL/min/1.73 square meters)  •  Stage 3B Moderate CKD (GFR = 30-44 mL/min/1.73 square meters)  •  Stage 4 Severe CKD (GFR = 15-29 mL/min/1.73 square meters)  •  Stage 5 End Stage CKD (GFR <15 mL/min/1.73 square meters)  Note: GFR calculation is accurate only with a steady state creatinine    CBC and differential [[id number]]  (Abnormal) Collected: 09/25/23 1346    Lab Status: Final result Specimen: Blood from Arm, Left Updated: 09/25/23 1350     WBC 11.37 Thousand/uL      RBC 4.58 Million/uL      Hemoglobin 11.0 g/dL      Hematocrit 34.9 %      MCV 76 fL      MCH 24.0 pg      MCHC 31.5 g/dL      RDW 15.0 %      MPV 9.2 fL      Platelets 695 Thousands/uL      nRBC 0 /100 WBCs      Neutrophils Relative 77 %      Immat GRANS % 0 %      Lymphocytes Relative 15 %      Monocytes Relative 7 %      Eosinophils Relative 1 %      Basophils Relative 0 %      Neutrophils Absolute 8.68 Thousands/µL      Immature Grans Absolute 0.04 Thousand/uL      Lymphocytes Absolute 1.73 Thousands/µL      Monocytes Absolute 0.80 Thousand/µL      Eosinophils Absolute 0.08 Thousand/µL      Basophils Absolute 0.04 Thousands/µL     Wound culture and Gram stain [[id number]] Collected: 09/25/23 1346    Lab Status: In process Specimen: Wound from Arm, Right Updated: 09/25/23 1348                 No orders to display              Procedures  Procedures         ED Course                                             Medical Decision Making  Patient arrives with bilateral forearms aggressively wrapped with gauze and tape. There is some swelling noted to the right forearm and dorsal aspect of the right hand, there is mild amount of erythema and warmth as well as purulence from the distal wound on the right forearm. Discussed with patient the importance of staying in the hospital for IV antibiotics, patient does not want to be admitted despite proper encouragement, this conversation was witnessed by Karla JIMENEZ. I discussed with patient we will add on additional Flagyl, she states she is she is significantly allergic to penicillin she is also reportedly allergic to clindamycin and tetracycline. Discussed with patient that this significantly limits treatment of dog bites. Patient multiple times states that she disagrees with wrapping the forearm, she reports that this is okay. Discussed once again the importance of adhering to appropriate medical management. She states that she should have and will follow-up with her PCP. Patient alert and oriented x4, disheveled and argumentative at times however redirectable. Cellulitis of right hand: acute illness or injury  Wound infection: acute illness or injury  Amount and/or Complexity of Data Reviewed  Labs: ordered. Risk  Prescription drug management. Disposition  Final diagnoses:   Wound infection   Cellulitis of right hand     Time reflects when diagnosis was documented in both MDM as applicable and the Disposition within this note     Time User Action Codes Description Comment    9/25/2023  2:18 PM Vania Turner Add [T14. 8XXA,  L08.9] Wound infection     9/25/2023  2:19 PM Annie Dimas, Josette Nation [Y90.030] Cellulitis of right hand       ED Disposition     ED Disposition   AMA    Condition   --    Date/Time   Mon Sep 25, 2023  2:18 PM    Comment   Date: 9/25/2023  Patient: Yoni Avery  Admitted: 9/25/2023  1:05 PM  Attending Provider: Juanito Argueta DO    Yoni Avery or her authorized caregiver has made the decision for the patient to leave the emergency department against the advice  of the emergency department staff. She or her authorized caregiver has been informed and understands the inherent risks, including death, stable. She or her authorized caregiver has decided to accept the responsibility for this decision. Warden Mark davis and all necessary parties have been advised that she may return for further evaluation or treatment. Her condition at time of discharge was stable. Yoni Avery had current vital signs as follows:  BP (!) 181/88 (BP Location: Left arm)    Pulse 74   Temp 99.1 °F (37.3 °C) (Tympanic)   Resp (!) 24            Follow-up Information     Follow up With Specialties Details Why Contact Info    Harry Tejada MD Internal Medicine Schedule an appointment as soon as possible for a visit in 1 day  [address]  [phone]            Patient's Medications   Discharge Prescriptions    METRONIDAZOLE (FLAGYL) 500 MG TABLET    Take 1 tablet (500 mg total) by mouth every 8 (eight) hours for 10 days       Start Date: 9/25/2023 End Date: 10/5/2023       Order Dose: 500 mg       Quantity: 30 tablet    Refills: 0       No discharge procedures on file.     PDMP Review     None          ED Provider  Electronically Signed by           Bella Maxwell PA-C  09/25/23 9459

## 2023-09-25 NOTE — TELEPHONE ENCOUNTER
Patient called declining appointment offered to her. She states that her hand and arm is the size of a balloon. She states that she is currently on her way to the ED. In the beginning of the call patient stated that she had not taken any medication prescribed Avelox from ER doctor, because she wanted to know if it was safe to take. I explained that I would send a message to clinical staff to discuss with PCP. However toward the end of the call, patient stated "It's too late now, I already took the dosages", Meaning, she took the medication before anyone could speak with PCP.

## 2023-09-26 ENCOUNTER — OFFICE VISIT (OUTPATIENT)
Dept: INTERNAL MEDICINE CLINIC | Facility: CLINIC | Age: 73
End: 2023-09-26

## 2023-09-26 VITALS
SYSTOLIC BLOOD PRESSURE: 126 MMHG | HEART RATE: 86 BPM | RESPIRATION RATE: 16 BRPM | HEIGHT: 59 IN | BODY MASS INDEX: 18.35 KG/M2 | TEMPERATURE: 98 F | OXYGEN SATURATION: 96 % | DIASTOLIC BLOOD PRESSURE: 80 MMHG | WEIGHT: 91 LBS
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
## 2023-09-26 DIAGNOSIS — E44.1 MILD PROTEIN-CALORIE MALNUTRITION (HCC): ICD-10-CM

## 2023-09-26 DIAGNOSIS — L03.113 CELLULITIS OF RIGHT UPPER EXTREMITY: Primary | ICD-10-CM

## 2023-09-26 DIAGNOSIS — Z91.199 MEDICALLY NONCOMPLIANT: ICD-10-CM
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
## 2023-09-26 PROBLEM — L03.119 CELLULITIS OF UPPER EXTREMITY: Status: ACTIVE | Noted: 2023-09-26

## 2023-09-26 PROCEDURE — 99213 OFFICE O/P EST LOW 20 MIN: CPT | Performed by: INTERNAL MEDICINE

## 2023-09-26 NOTE — ASSESSMENT & PLAN NOTE
Patient noncompliant with the treatment follow-up recommendation is an understatement does not like to have any blood work done mammogram colonoscopy refused all the time was seen in the emergency room twice advised hospitalization patient refused although improving does not want to come in for follow-up status is very costly has Medicare part a only does not have Medicare part B so patient claims they all the hospital services are covered so she is going to the hospital only not going to see any doctors outpatient

## 2023-09-26 NOTE — ASSESSMENT & PLAN NOTE
Malnutrition Findings: Advised to gain weight nutritional counseling. Patient noncompliant. Refused all the recommendation                                BMI Findings: Body mass index is 18.38 kg/m².

## 2023-09-26 NOTE — ASSESSMENT & PLAN NOTE
Patient came in after dog bite to seen in the emergency room twice for the cellulitis being treated with curative biotic Avelox and Flagyl improving and patient was advised to stay overnight patient refused advised if any worsening of the symptoms must go to the emergency room very noncompliant is an understatement Home

## 2023-09-26 NOTE — ASSESSMENT & PLAN NOTE
Treated in the hospital stitches in place as some redness being treated with 2 antibiotic Avelox and Flagyl no obvious sign of infection advised to follow-up with the wound center patient refused patient claims and stated not going to come and in the office to remove the stitches because his only Medicare part a of the hospital services are covered so she prefers to go to the emergency room

## 2023-09-26 NOTE — PROGRESS NOTES
BMI Counseling: Body mass index is 18.38 kg/m². The BMI is below normal. Patient advised to gain weight and dietary education for weight gain was provided. Rationale for BMI follow-up plan is due to patient being underweight. Tobacco Cessation Counseling: Tobacco cessation counseling was provided. The patient is sincerely urged to quit consumption of tobacco. She is not ready to quit tobacco.       Dr. Elsy Talavera Office Visit Note  23     Goemz Fisher 74 y.o. female MRN: [id number]  : 1950    Assessment:     1. Cellulitis of right upper extremity  Assessment & Plan:  Patient came in after dog bite to seen in the emergency room twice for the cellulitis being treated with curative biotic Avelox and Flagyl improving and patient was advised to stay overnight patient refused advised if any worsening of the symptoms must go to the emergency room very noncompliant is an understatement      2. Mild protein-calorie malnutrition ([address])  Assessment & Plan:  Malnutrition Findings: Advised to gain weight nutritional counseling. Patient noncompliant. Refused all the recommendation                                BMI Findings: Body mass index is 18.38 kg/m². 3. Dog bite, subsequent encounter  Assessment & Plan:  Treated in the hospital stitches in place as some redness being treated with 2 antibiotic Avelox and Flagyl no obvious sign of infection advised to follow-up with the wound center patient refused patient claims and stated not going to come and in the office to remove the stitches because his only Medicare part a of the hospital services are covered so she prefers to go to the emergency room      4.  Medically noncompliant  Assessment & Plan:  Patient noncompliant with the treatment follow-up recommendation is an understatement does not like to have any blood work done mammogram colonoscopy refused all the time was seen in the emergency room twice advised hospitalization patient refused although improving does not want to come in for follow-up status is very costly has Medicare part a only does not have Medicare part B so patient claims they all the hospital services are covered so she is going to the hospital only not going to see any doctors outpatient            Discussion Summary and Plan: Today's care plan and medications were reviewed with patient in detail and all their questions answered to their satisfaction. Chief Complaint   Patient presents with   • Animal Bite     Thinks its infected. Subjective:  Patient can follow-up after the blood life check treated in the hospital with Dr. Antibiotic for cellulitis was advised to stay out of the hospital patient refused refusing further recommendation patient noncompliant although the cellulitis improving his stitches in place and was advised to come in the office for stitch removal patient refuses physical and Medicare part a does not have a Medicare part B is all the hospital so this is a work so he would prefer to go to the emergency room advised if any worsening of the condition fever chills submuscular to the ER immediately      The following portions of the patient's history were reviewed and updated as appropriate: allergies, current medications, past family history, past medical history, past social history, past surgical history and problem list.    Review of Systems   Constitutional: Negative for activity change, appetite change, chills, diaphoresis, fatigue, fever and unexpected weight change. HENT: Negative for congestion, dental problem, drooling, ear discharge, ear pain, facial swelling, hearing loss, mouth sores, nosebleeds, postnasal drip, rhinorrhea, sinus pressure, sneezing, sore throat, tinnitus, trouble swallowing and voice change. Eyes: Negative for photophobia, pain, discharge, redness, itching and visual disturbance.    Respiratory: Negative for apnea, cough, choking, chest tightness, shortness of breath, wheezing and stridor. Cardiovascular: Negative for chest pain, palpitations and leg swelling. Gastrointestinal: Negative for abdominal distention, abdominal pain, anal bleeding, blood in stool, constipation, diarrhea, nausea, rectal pain and vomiting. Endocrine: Negative for cold intolerance, heat intolerance, polydipsia, polyphagia and polyuria. Genitourinary: Negative for decreased urine volume, difficulty urinating, dysuria, enuresis, flank pain, frequency, genital sores, hematuria and urgency. Musculoskeletal: Positive for arthralgias. Negative for back pain, gait problem, joint swelling, myalgias, neck pain and neck stiffness. Skin: Negative for color change, pallor, rash and wound. Allergic/Immunologic: Negative. Negative for environmental allergies, food allergies and immunocompromised state. Neurological: Negative for dizziness, tremors, seizures, syncope, facial asymmetry, speech difficulty, weakness, light-headedness, numbness and headaches. Psychiatric/Behavioral: Negative for agitation, behavioral problems, confusion, decreased concentration, dysphoric mood, hallucinations, self-injury, sleep disturbance and suicidal ideas. The patient is nervous/anxious. The patient is not hyperactive. Historical Information   Patient Active Problem List   Diagnosis   • Smoking   • Primary osteoarthritis of both knees   • Mild protein-calorie malnutrition ([address])   • Dog bite   • Cellulitis of upper extremity   • Medically noncompliant     Past Medical History:   Diagnosis Date   • Hiatal hernia      Past Surgical History:   Procedure Laterality Date   •  SECTION     • HERNIA REPAIR       Social History     Substance and Sexual Activity   Alcohol Use No     Social History     Substance and Sexual Activity   Drug Use No     Social History     Tobacco Use   Smoking Status Every Day   • Packs/day: 3.00   • Types: Cigarettes   Smokeless Tobacco Never     No family history on file.   Health Maintenance Due   Topic   • Hepatitis C Screening    • COVID-19 Vaccine (1)   • Pneumococcal Vaccine: 65+ Years (1 - PCV)   • Annual Physical    • DTaP,Tdap,and Td Vaccines (1 - Tdap)   • Colorectal Cancer Screening    • Osteoporosis Screening    • Fall Risk    • Urinary Incontinence Screening    • Influenza Vaccine (1)   • Depression Screening       Meds/Allergies       Current Outpatient Medications:   •  metroNIDAZOLE (FLAGYL) 500 mg tablet, Take 1 tablet (500 mg total) by mouth every 8 (eight) hours for 10 days, Disp: 30 tablet, Rfl: 0  •  moxifloxacin (AVELOX) 400 MG tablet, Take 1 tablet (400 mg total) by mouth daily for 7 days, Disp: 7 tablet, Rfl: 0      Objective:    Vitals:   /80   Pulse 86   Temp 98 °F (36.7 °C)   Resp 16   Ht 4' 11" (1.499 m)   Wt 41.3 kg (91 lb)   SpO2 96%   BMI 18.38 kg/m²   Body mass index is 18.38 kg/m². Vitals:    09/26/23 1309   Weight: 41.3 kg (91 lb)       Physical Exam  Vitals and nursing note reviewed. Constitutional:       General: She is not in acute distress. Appearance: She is well-developed. She is not ill-appearing, toxic-appearing or diaphoretic. Comments:  cachectic   HENT:      Head: Normocephalic and atraumatic. Right Ear: External ear normal.      Left Ear: External ear normal.      Nose: Nose normal.      Mouth/Throat:      Pharynx: No oropharyngeal exudate. Eyes:      General: Lids are normal. Lids are everted, no foreign bodies appreciated. No scleral icterus. Right eye: No discharge. Left eye: No discharge. Conjunctiva/sclera: Conjunctivae normal.      Pupils: Pupils are equal, round, and reactive to light. Neck:      Thyroid: No thyromegaly. Vascular: Normal carotid pulses. No carotid bruit, hepatojugular reflux or JVD. Trachea: No tracheal tenderness or tracheal deviation. Cardiovascular:      Rate and Rhythm: Normal rate and regular rhythm. Pulses: Normal pulses.       Heart sounds: Normal heart sounds. No murmur heard. No friction rub. No gallop. Pulmonary:      Effort: Pulmonary effort is normal. No respiratory distress. Breath sounds: Normal breath sounds. No stridor. No wheezing or rales. Chest:      Chest wall: No tenderness. Abdominal:      General: Bowel sounds are normal. There is no distension. Palpations: Abdomen is soft. There is no mass. Tenderness: There is no abdominal tenderness. There is no guarding or rebound. Musculoskeletal:         General: No tenderness or deformity. Normal range of motion. Cervical back: Normal range of motion and neck supple. No edema, erythema or rigidity. No spinous process tenderness or muscular tenderness. Normal range of motion. Comments: Upper extremity dog bite sutured with some redness right extremity mainly forearm   Lymphadenopathy:      Head:      Right side of head: No submental, submandibular, tonsillar, preauricular or posterior auricular adenopathy. Left side of head: No submental, submandibular, tonsillar, preauricular, posterior auricular or occipital adenopathy. Cervical: No cervical adenopathy. Right cervical: No superficial, deep or posterior cervical adenopathy. Left cervical: No superficial, deep or posterior cervical adenopathy. Upper Body:      Right upper body: No pectoral adenopathy. Left upper body: No pectoral adenopathy. Skin:     General: Skin is warm and dry. Coloration: Skin is not pale. Findings: No erythema or rash. Neurological:      Mental Status: She is alert and oriented to person, place, and time. Cranial Nerves: No cranial nerve deficit. Sensory: No sensory deficit. Motor: No tremor, abnormal muscle tone or seizure activity. Coordination: Coordination normal.      Gait: Gait normal.      Deep Tendon Reflexes: Reflexes are normal and symmetric.  Reflexes normal.   Psychiatric:         Behavior: Behavior normal.         Thought Content:  Thought content normal.         Judgment: Judgment normal.         Lab Review   Admission on 09/25/2023, Discharged on 09/25/2023   Component Date Value Ref Range Status   • WBC 09/25/2023 11.37 (H)  4.31 - 10.16 Thousand/uL Final   • RBC 09/25/2023 4.58  3.81 - 5.12 Million/uL Final   • Hemoglobin 09/25/2023 11.0 (L)  11.5 - 15.4 g/dL Final   • Hematocrit 09/25/2023 34.9  34.8 - 46.1 % Final   • MCV 09/25/2023 76 (L)  82 - 98 fL Final   • MCH 09/25/2023 24.0 (L)  26.8 - 34.3 pg Final   • MCHC 09/25/2023 31.5  31.4 - 37.4 g/dL Final   • RDW 09/25/2023 15.0  11.6 - 15.1 % Final   • MPV 09/25/2023 9.2  8.9 - 12.7 fL Final   • Platelets 76/43/7025 245  149 - 390 Thousands/uL Final   • nRBC 09/25/2023 0  /100 WBCs Final   • Neutrophils Relative 09/25/2023 77 (H)  43 - 75 % Final   • Immat GRANS % 09/25/2023 0  0 - 2 % Final   • Lymphocytes Relative 09/25/2023 15  14 - 44 % Final   • Monocytes Relative 09/25/2023 7  4 - 12 % Final   • Eosinophils Relative 09/25/2023 1  0 - 6 % Final   • Basophils Relative 09/25/2023 0  0 - 1 % Final   • Neutrophils Absolute 09/25/2023 8.68 (H)  1.85 - 7.62 Thousands/µL Final   • Immature Grans Absolute 09/25/2023 0.04  0.00 - 0.20 Thousand/uL Final   • Lymphocytes Absolute 09/25/2023 1.73  0.60 - 4.47 Thousands/µL Final   • Monocytes Absolute 09/25/2023 0.80  0.17 - 1.22 Thousand/µL Final   • Eosinophils Absolute 09/25/2023 0.08  0.00 - 0.61 Thousand/µL Final   • Basophils Absolute 09/25/2023 0.04  0.00 - 0.10 Thousands/µL Final   • Sodium 09/25/2023 136  135 - 147 mmol/L Final   • Potassium 09/25/2023 3.9  3.5 - 5.3 mmol/L Final   • Chloride 09/25/2023 104  96 - 108 mmol/L Final   • CO2 09/25/2023 26  21 - 32 mmol/L Final   • ANION GAP 09/25/2023 6  mmol/L Final   • BUN 09/25/2023 14  5 - 25 mg/dL Final   • Creatinine 09/25/2023 0.61  0.60 - 1.30 mg/dL Final    Standardized to IDMS reference method   • Glucose 09/25/2023 114  65 - 140 mg/dL Final    If the patient is fasting, the ADA then defines impaired fasting glucose as > 100 mg/dL and diabetes as > or equal to 123 mg/dL. • Calcium 09/25/2023 9.2  8.4 - 10.2 mg/dL Final   • eGFR 09/25/2023 88  ml/min/1.73sq m Final   • Wound Culture 09/25/2023 Culture too young- will reincubate   Preliminary   • Gram Stain Result 09/25/2023 1+ Disintegrating polys   Preliminary   • Gram Stain Result 09/25/2023 No bacteria seen   Preliminary         Patient Instructions   Animal Bite   WHAT YOU NEED TO KNOW:   Animal bite injuries range from shallow cuts to deep, life-threatening wounds. An animal can cut or puncture the skin when it bites. Your skin may be torn from your body. Your skin may swell or bruise even if the bite does not break the skin. Animal bites occur more often on the hands, arms, legs, and face. Bites from dogs and cats are the most common injuries. DISCHARGE INSTRUCTIONS:   Return to the emergency department if:   You have a fever. Your wound is red, swollen, and draining pus. You see red streaks on the skin around the wound. You can no longer move the bitten area. Your heartbeat and breathing are much faster than usual.    You feel dizzy and confused. Contact your healthcare provider if:   Your pain does not get better, even after you take pain medicine. You have nightmares or flashbacks about the animal bite. You have questions or concerns about your condition or care. Medicines: You may need any of the following:  Antibiotics  prevent or treat a bacterial infection. Prescription pain medicine  may be given. Ask how to take this medicine safely. A tetanus vaccine  may be needed to prevent tetanus. Tetanus is a life-threatening bacterial infection that affects the nerves and muscles. The bacteria can be spread through animal bites. A rabies vaccine  may be needed to prevent rabies. Rabies is a life-threatening viral infection. The virus can be spread through animal bites.     Take your medicine as directed. Contact your healthcare provider if you think your medicine is not helping or if you have side effects. Tell your provider if you are allergic to any medicine. Keep a list of the medicines, vitamins, and herbs you take. Include the amounts, and when and why you take them. Bring the list or the pill bottles to follow-up visits. Carry your medicine list with you in case of an emergency. Follow up with your healthcare provider in 1 to 2 days: You may need to return to have your stitches removed. Write down your questions so you remember to ask them during your visits. Self-care:   Apply antibiotic ointment as directed. This helps prevent infection in minor skin wounds. It is available without a doctor's order. Keep the wound clean and covered. Wash the wound every day with soap and water or germ-killing cleanser. Ask your healthcare provider about the kinds of bandages to use. Apply ice on your wound. Ice helps decrease swelling and pain. Ice may also help prevent tissue damage. Use an ice pack, or put crushed ice in a plastic bag. Cover it with a towel and place it on your wound for 15 to 20 minutes every hour or as directed. Elevate the wound area. Raise your wound above the level of your heart as often as you can. This will help decrease swelling and pain. Prop your wound on pillows or blankets to keep it elevated comfortably. Prevent another animal bite:   Learn to recognize the signs of a scared or angry pet. Avoid quick, sudden movements. Do not step between animals that are fighting. Do not leave a pet alone with a young child. Do not disturb an animal while it eats, sleeps, or cares for its young. Do not approach an animal you do not know, especially one that is tied up or caged. Stay away from animals that seem sick or act strangely. Do not feed or capture wild animals.     © Copyright [employer] 2023 Information is for End User's use only and may not be sold, redistributed or otherwise used for commercial purposes. The above information is an  only. It is not intended as medical advice for individual conditions or treatments. Talk to your doctor, nurse or pharmacist before following any medical regimen to see if it is safe and effective for you. Gigi Shcmidt MD        "This note has been constructed using a voice recognition system. Therefore there may be syntax, spelling, and/or grammatical errors.  Please call if you have any questions. "

## 2023-09-26 NOTE — PATIENT INSTRUCTIONS
Animal Bite   WHAT YOU NEED TO KNOW:   Animal bite injuries range from shallow cuts to deep, life-threatening wounds. An animal can cut or puncture the skin when it bites. Your skin may be torn from your body. Your skin may swell or bruise even if the bite does not break the skin. Animal bites occur more often on the hands, arms, legs, and face. Bites from dogs and cats are the most common injuries. DISCHARGE INSTRUCTIONS:   Return to the emergency department if:   You have a fever. Your wound is red, swollen, and draining pus. You see red streaks on the skin around the wound. You can no longer move the bitten area. Your heartbeat and breathing are much faster than usual.    You feel dizzy and confused. Contact your healthcare provider if:   Your pain does not get better, even after you take pain medicine. You have nightmares or flashbacks about the animal bite. You have questions or concerns about your condition or care. Medicines: You may need any of the following:  Antibiotics  prevent or treat a bacterial infection. Prescription pain medicine  may be given. Ask how to take this medicine safely. A tetanus vaccine  may be needed to prevent tetanus. Tetanus is a life-threatening bacterial infection that affects the nerves and muscles. The bacteria can be spread through animal bites. A rabies vaccine  may be needed to prevent rabies. Rabies is a life-threatening viral infection. The virus can be spread through animal bites. Take your medicine as directed. Contact your healthcare provider if you think your medicine is not helping or if you have side effects. Tell your provider if you are allergic to any medicine. Keep a list of the medicines, vitamins, and herbs you take. Include the amounts, and when and why you take them. Bring the list or the pill bottles to follow-up visits. Carry your medicine list with you in case of an emergency.     Follow up with your healthcare provider in 1 to 2 days: You may need to return to have your stitches removed. Write down your questions so you remember to ask them during your visits. Self-care:   Apply antibiotic ointment as directed. This helps prevent infection in minor skin wounds. It is available without a doctor's order. Keep the wound clean and covered. Wash the wound every day with soap and water or germ-killing cleanser. Ask your healthcare provider about the kinds of bandages to use. Apply ice on your wound. Ice helps decrease swelling and pain. Ice may also help prevent tissue damage. Use an ice pack, or put crushed ice in a plastic bag. Cover it with a towel and place it on your wound for 15 to 20 minutes every hour or as directed. Elevate the wound area. Raise your wound above the level of your heart as often as you can. This will help decrease swelling and pain. Prop your wound on pillows or blankets to keep it elevated comfortably. Prevent another animal bite:   Learn to recognize the signs of a scared or angry pet. Avoid quick, sudden movements. Do not step between animals that are fighting. Do not leave a pet alone with a young child. Do not disturb an animal while it eats, sleeps, or cares for its young. Do not approach an animal you do not know, especially one that is tied up or caged. Stay away from animals that seem sick or act strangely. Do not feed or capture wild animals. © Copyright [employer] 2023 Information is for End User's use only and may not be sold, redistributed or otherwise used for commercial purposes. The above information is an  only. It is not intended as medical advice for individual conditions or treatments. Talk to your doctor, nurse or pharmacist before following any medical regimen to see if it is safe and effective for you.

## 2023-09-27 ENCOUNTER — APPOINTMENT (INPATIENT)
Dept: RADIOLOGY | Facility: HOSPITAL | Age: 73
DRG: 603 | End: 2023-09-27
Payer: MEDICARE

## 2023-09-27 ENCOUNTER — HOSPITAL ENCOUNTER (INPATIENT)
Facility: HOSPITAL | Age: 73
LOS: 3 days | Discharge: HOME | DRG: 603 | End: 2023-09-30
Attending: EMERGENCY MEDICINE | Admitting: INTERNAL MEDICINE
Payer: MEDICARE

## 2023-09-27 ENCOUNTER — TELEPHONE (OUTPATIENT)
Age: 73
End: 2023-09-27

## 2023-09-27 ENCOUNTER — HOSPITAL ENCOUNTER (EMERGENCY)
Facility: HOSPITAL | Age: 73
Discharge: HOME | DRG: 603 | End: 2023-09-27
Attending: EMERGENCY MEDICINE | Admitting: EMERGENCY MEDICINE
Payer: MEDICARE

## 2023-09-27 VITALS
TEMPERATURE: 98.8 F | HEIGHT: 59 IN | BODY MASS INDEX: 18.35 KG/M2 | HEART RATE: 90 BPM | DIASTOLIC BLOOD PRESSURE: 96 MMHG | WEIGHT: 91 LBS | RESPIRATION RATE: 20 BRPM | SYSTOLIC BLOOD PRESSURE: 166 MMHG | OXYGEN SATURATION: 95 %
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
## 2023-09-27 DIAGNOSIS — L03.114 CELLULITIS OF LEFT UPPER EXTREMITY: ICD-10-CM

## 2023-09-27 DIAGNOSIS — Z51.89 VISIT FOR WOUND CHECK: Primary | ICD-10-CM

## 2023-09-27 DIAGNOSIS — L02.413 ABSCESS OF RIGHT FOREARM: ICD-10-CM

## 2023-09-27 DIAGNOSIS — L03.113 CELLULITIS OF RIGHT UPPER EXTREMITY: Primary | ICD-10-CM

## 2023-09-27 DIAGNOSIS — L03.113 CELLULITIS OF RIGHT UPPER EXTREMITY: ICD-10-CM

## 2023-09-27 LAB
ANION GAP SERPL CALCULATED.3IONS-SCNC: 7 MMOL/L
BACTERIA WND AEROBE CULT: ABNORMAL
BACTERIA WND AEROBE CULT: ABNORMAL
BASOPHILS # BLD AUTO: 0.05 THOUSANDS/ÂΜL (ref 0–0.1)
BASOPHILS NFR BLD AUTO: 1 % (ref 0–1)
BUN SERPL-MCNC: 16 MG/DL (ref 5–25)
CALCIUM SERPL-MCNC: 9.1 MG/DL (ref 8.4–10.2)
CHLORIDE SERPL-SCNC: 109 MMOL/L (ref 96–108)
CO2 SERPL-SCNC: 26 MMOL/L (ref 21–32)
CREAT SERPL-MCNC: 0.69 MG/DL (ref 0.6–1.3)
EOSINOPHIL # BLD AUTO: 0.13 THOUSAND/ÂΜL (ref 0–0.61)
EOSINOPHIL NFR BLD AUTO: 2 % (ref 0–6)
ERYTHROCYTE [DISTWIDTH] IN BLOOD BY AUTOMATED COUNT: 14.6 % (ref 11.6–15.1)
GFR SERPL CREATININE-BSD FRML MDRD: 85 ML/MIN/1.73SQ M
GLUCOSE SERPL-MCNC: 106 MG/DL (ref 65–140)
GRAM STN SPEC: ABNORMAL
GRAM STN SPEC: ABNORMAL
HCT VFR BLD AUTO: 34.4 % (ref 34.8–46.1)
HGB BLD-MCNC: 10.9 G/DL (ref 11.5–15.4)
IMM GRANULOCYTES # BLD AUTO: 0.03 THOUSAND/UL (ref 0–0.2)
IMM GRANULOCYTES NFR BLD AUTO: 0 % (ref 0–2)
LYMPHOCYTES # BLD AUTO: 1.5 THOUSANDS/ÂΜL (ref 0.6–4.47)
LYMPHOCYTES NFR BLD AUTO: 19 % (ref 14–44)
MAGNESIUM SERPL-MCNC: 2.1 MG/DL (ref 1.9–2.7)
MCH RBC QN AUTO: 23.6 PG (ref 26.8–34.3)
MCHC RBC AUTO-ENTMCNC: 31.7 G/DL (ref 31.4–37.4)
MCV RBC AUTO: 75 FL (ref 82–98)
MONOCYTES # BLD AUTO: 0.65 THOUSAND/ÂΜL (ref 0.17–1.22)
MONOCYTES NFR BLD AUTO: 8 % (ref 4–12)
NEUTROPHILS # BLD AUTO: 5.56 THOUSANDS/ÂΜL (ref 1.85–7.62)
NEUTS SEG NFR BLD AUTO: 70 % (ref 43–75)
NRBC BLD AUTO-RTO: 0 /100 WBCS
PLATELET # BLD AUTO: 305 THOUSANDS/UL (ref 149–390)
PMV BLD AUTO: 9.2 FL (ref 8.9–12.7)
POTASSIUM SERPL-SCNC: 3.9 MMOL/L (ref 3.5–5.3)
RBC # BLD AUTO: 4.61 MILLION/UL (ref 3.81–5.12)
SODIUM SERPL-SCNC: 142 MMOL/L (ref 135–147)
WBC # BLD AUTO: 7.92 THOUSAND/UL (ref 4.31–10.16)

## 2023-09-27 PROCEDURE — 83735 ASSAY OF MAGNESIUM: CPT | Performed by: EMERGENCY MEDICINE

## 2023-09-27 PROCEDURE — 80048 BASIC METABOLIC PNL TOTAL CA: CPT | Performed by: EMERGENCY MEDICINE

## 2023-09-27 PROCEDURE — 99283 EMERGENCY DEPT VISIT LOW MDM: CPT

## 2023-09-27 PROCEDURE — 87040 BLOOD CULTURE FOR BACTERIA: CPT | Performed by: EMERGENCY MEDICINE

## 2023-09-27 PROCEDURE — 99284 EMERGENCY DEPT VISIT MOD MDM: CPT | Performed by: EMERGENCY MEDICINE

## 2023-09-27 PROCEDURE — 99255 IP/OBS CONSLTJ NEW/EST HI 80: CPT | Performed by: INTERNAL MEDICINE

## 2023-09-27 PROCEDURE — 99285 EMERGENCY DEPT VISIT HI MDM: CPT | Performed by: EMERGENCY MEDICINE

## 2023-09-27 PROCEDURE — 73201 CT UPPER EXTREMITY W/DYE: CPT

## 2023-09-27 PROCEDURE — 99222 1ST HOSP IP/OBS MODERATE 55: CPT | Performed by: INTERNAL MEDICINE

## 2023-09-27 PROCEDURE — 99282 EMERGENCY DEPT VISIT SF MDM: CPT

## 2023-09-27 PROCEDURE — 36415 COLL VENOUS BLD VENIPUNCTURE: CPT | Performed by: EMERGENCY MEDICINE

## 2023-09-27 PROCEDURE — 85025 COMPLETE CBC W/AUTO DIFF WBC: CPT | Performed by: EMERGENCY MEDICINE

## 2023-09-27 PROCEDURE — G1004 CDSM NDSC: HCPCS

## 2023-09-27 RX ORDER — CEFTRIAXONE 1 G/50ML
1000 INJECTION, SOLUTION INTRAVENOUS ONCE
Status: COMPLETED | OUTPATIENT
Start: 2023-09-27 | End: 2023-09-27

## 2023-09-27 RX ORDER — DIPHENOXYLATE HYDROCHLORIDE AND ATROPINE SULFATE 2.5; .025 MG/1; MG/1
1 TABLET ORAL DAILY
COMMUNITY
End: 2023-09-30

## 2023-09-27 RX ORDER — VANCOMYCIN HYDROCHLORIDE 1 G/200ML
25 INJECTION, SOLUTION INTRAVENOUS ONCE
Status: COMPLETED | OUTPATIENT
Start: 2023-09-27 | End: 2023-09-27

## 2023-09-27 RX ORDER — NICOTINE 21 MG/24HR
1 PATCH, TRANSDERMAL 24 HOURS TRANSDERMAL DAILY
Status: DISCONTINUED | OUTPATIENT
Start: 2023-09-27 | End: 2023-09-30 | Stop reason: HOSPADM

## 2023-09-27 RX ORDER — HEPARIN SODIUM 5000 [USP'U]/ML
5000 INJECTION, SOLUTION INTRAVENOUS; SUBCUTANEOUS EVERY 8 HOURS SCHEDULED
Status: DISCONTINUED | OUTPATIENT
Start: 2023-09-27 | End: 2023-09-28

## 2023-09-27 RX ORDER — CEFTRIAXONE 1 G/50ML
1000 INJECTION, SOLUTION INTRAVENOUS EVERY 24 HOURS
Status: DISCONTINUED | OUTPATIENT
Start: 2023-09-28 | End: 2023-09-28

## 2023-09-27 RX ORDER — CEFTRIAXONE 1 G/50ML
1000 INJECTION, SOLUTION INTRAVENOUS EVERY 24 HOURS
Status: DISCONTINUED | OUTPATIENT
Start: 2023-09-28 | End: 2023-09-27

## 2023-09-27 RX ADMIN — SODIUM CHLORIDE 500 ML: 0.9 INJECTION, SOLUTION INTRAVENOUS at 08:43

## 2023-09-27 RX ADMIN — CEFTRIAXONE 1000 MG: 1 INJECTION, SOLUTION INTRAVENOUS at 08:51

## 2023-09-27 RX ADMIN — IOHEXOL 100 ML: 350 INJECTION, SOLUTION INTRAVENOUS at 14:41

## 2023-09-27 RX ADMIN — VANCOMYCIN HYDROCHLORIDE 1000 MG: 1 INJECTION, SOLUTION INTRAVENOUS at 09:52

## 2023-09-27 NOTE — ED NOTES
Right lower arm with stitches still intact, area around arm noted to be slightly swollen, but not red. Left arm with stitches intact, no swelling or redness noted. Patient is being nasty to nurse during triage and making demands from beginning of rooming her. ER MD in to examine patient.      Anthony Nolasco, RN  09/27/23 207 [facility], RN  09/27/23 2050

## 2023-09-27 NOTE — ED NOTES
Checked in on pt and she is on ph while R arm bent causing antibiotic IV fluids drip to stop . Advised pt to try and keep arm straight to allow IV fluids to flow properly.       Rhys Arias RN  09/27/23 [id number]

## 2023-09-27 NOTE — ED PROVIDER NOTES
History  Chief Complaint   Patient presents with   • Wound Check     Pt returning for wound check and care. Per pt daughter told her to stay and follow staff instructions     24-year-old female presents to the ED for wound check. Patient sustained dog bites from a friend's dog to both hands on 9/22/2023. Patient was seen at her emergency department and had suture repaired. Patient was started on prophylactic moxifloxacin for possible wound infection. Patient noted redness and swelling around her wound to bilateral upper extremity. Patient was seen at our emergency department 2 days ago. In general patient has a very cantankerous demeanor. Patient is very abrasive to staff. Patient was offered admission for IV antibiotics as patient was having increasing swelling to her wounds 2 days ago. Patient was very adamant about not trying anything else except for azithromycin because that is the only antibiotic that works for her and she is not allergic to. At that time patient signed out [facility]. Patient was discharged with follow-up to PCP. Patient saw her PCP, Dr. Aleksandra Null, yesterday. Patient states that "Dr. Precious Pollock did not do anything for me yesterday."  Patient was told to come to the emergency department for any further concerns. Patient was seen by me earlier this morning. In triage today patient was very abrasive to triage nurse. Patient was answering question in an angry demeanor. Patient is extremely angry that her wounds became infected. Patient is adamantly stating to triage nurse that she will not be staying for any admissions. Patient denies any fevers or chills. When I evaluated patient, patient has been on azithromycin and moxifloxacin and her wound and right upper extremity appears to be infected. I recommended IV antibiotic and admission however patient adamantly refused and started to walk out of the emergency department.   Patient was signed out [facility] and told to follow-up with her PCP. Patient then had a talk with her daughter who advised patient to go back to the emergency department and comply with the medical staff and their expertise. Patient subsequently presented back to the emergency department for admission. At this time patient is agreeable to admission and IV antibiotics.           History provided by:  Patient  Wound Check         Prior to Admission Medications   Prescriptions Last Dose Informant Patient Reported? Taking?   metroNIDAZOLE (FLAGYL) 500 mg tablet   No No   Sig: Take 1 tablet (500 mg total) by mouth every 8 (eight) hours for 10 days   moxifloxacin (AVELOX) 400 MG tablet   No No   Sig: Take 1 tablet (400 mg total) by mouth daily for 7 days      Facility-Administered Medications: None       Past Medical History:   Diagnosis Date   • Hiatal hernia        Past Surgical History:   Procedure Laterality Date   •  SECTION     • HERNIA REPAIR         History reviewed. No pertinent family history. I have reviewed and agree with the history as documented. E-Cigarette/Vaping   • E-Cigarette Use Never User      E-Cigarette/Vaping Substances     Social History     Tobacco Use   • Smoking status: Every Day     Packs/day: 3.00     Types: Cigarettes   • Smokeless tobacco: Never   Vaping Use   • Vaping Use: Never used   Substance Use Topics   • Alcohol use: No   • Drug use: No       Review of Systems   Constitutional: Negative for chills and fever. HENT: Negative for ear pain and sore throat. Eyes: Negative for pain and visual disturbance. Respiratory: Negative for cough and shortness of breath. Cardiovascular: Negative for chest pain and palpitations. Gastrointestinal: Negative for abdominal pain and vomiting. Genitourinary: Negative for dysuria and hematuria. Musculoskeletal: Negative for arthralgias and back pain. Skin: Positive for wound. Negative for color change and rash. Neurological: Negative for seizures and syncope. All other systems reviewed and are negative. Physical Exam  Physical Exam  Vitals and nursing note reviewed. Constitutional:       General: She is not in acute distress. Appearance: She is well-developed. HENT:      Head: Normocephalic and atraumatic. Eyes:      Conjunctiva/sclera: Conjunctivae normal.   Cardiovascular:      Rate and Rhythm: Normal rate and regular rhythm. Heart sounds: No murmur heard. Pulmonary:      Effort: Pulmonary effort is normal. No respiratory distress. Breath sounds: Normal breath sounds. Abdominal:      Palpations: Abdomen is soft. Tenderness: There is no abdominal tenderness. Musculoskeletal:         General: No swelling. Cervical back: Neck supple. Comments: Pulses intact to bilateral upper extremities. Skin:     General: Skin is warm and dry. Capillary Refill: Capillary refill takes less than 2 seconds. Comments: Multiple wounds with laceration in place noted to bilateral forearms. Redness, warm to touch, and edema noted around the wounds of right upper extremity. Please see pictures below for clarification. Neurological:      Mental Status: She is alert.    Psychiatric:         Mood and Affect: Mood normal.                     Vital Signs  ED Triage Vitals [09/27/23 0817]   Temperature Pulse Respirations Blood Pressure SpO2   98.8 °F (37.1 °C) 90 20 166/96 95 %      Temp Source Heart Rate Source Patient Position - Orthostatic VS BP Location FiO2 (%)   Oral Monitor Lying Left arm --      Pain Score       No Pain           Vitals:    09/27/23 0817   BP: 166/96   Pulse: 90   Patient Position - Orthostatic VS: Lying         Visual Acuity      ED Medications  Medications   sodium chloride 0.9 % bolus 500 mL (500 mL Intravenous New Bag 9/27/23 0843)   vancomycin (VANCOCIN) IVPB (premix in dextrose) 1,000 mg 200 mL (has no administration in time range)   cefTRIAXone (ROCEPHIN) IVPB (premix in dextrose) 1,000 mg 50 mL (1,000 mg Intravenous New Bag 9/27/23 0851)       Diagnostic Studies  Results Reviewed     Procedure Component Value Units Date/Time    Basic metabolic panel [[id number]]  (Abnormal) Collected: 09/27/23 0843    Lab Status: Final result Specimen: Blood from Arm, Right Updated: 09/27/23 0921     Sodium 142 mmol/L      Potassium 3.9 mmol/L      Chloride 109 mmol/L      CO2 26 mmol/L      ANION GAP 7 mmol/L      BUN 16 mg/dL      Creatinine 0.69 mg/dL      Glucose 106 mg/dL      Calcium 9.1 mg/dL      eGFR 85 ml/min/1.73sq m     Narrative:      [place] guidelines for Chronic Kidney Disease (CKD):   •  Stage 1 with normal or high GFR (GFR > 90 mL/min/1.73 square meters)  •  Stage 2 Mild CKD (GFR = 60-89 mL/min/1.73 square meters)  •  Stage 3A Moderate CKD (GFR = 45-59 mL/min/1.73 square meters)  •  Stage 3B Moderate CKD (GFR = 30-44 mL/min/1.73 square meters)  •  Stage 4 Severe CKD (GFR = 15-29 mL/min/1.73 square meters)  •  Stage 5 End Stage CKD (GFR <15 mL/min/1.73 square meters)  Note: GFR calculation is accurate only with a steady state creatinine    Magnesium [[id number]]  (Normal) Collected: 09/27/23 0843    Lab Status: Final result Specimen: Blood from Arm, Right Updated: 09/27/23 0921     Magnesium 2.1 mg/dL     CBC and differential [[id number]]  (Abnormal) Collected: 09/27/23 0843    Lab Status: Final result Specimen: Blood from Arm, Left Updated: 09/27/23 0854     WBC 7.92 Thousand/uL      RBC 4.61 Million/uL      Hemoglobin 10.9 g/dL      Hematocrit 34.4 %      MCV 75 fL      MCH 23.6 pg      MCHC 31.7 g/dL      RDW 14.6 %      MPV 9.2 fL      Platelets 851 Thousands/uL      nRBC 0 /100 WBCs      Neutrophils Relative 70 %      Immat GRANS % 0 %      Lymphocytes Relative 19 %      Monocytes Relative 8 %      Eosinophils Relative 2 %      Basophils Relative 1 %      Neutrophils Absolute 5.56 Thousands/µL      Immature Grans Absolute 0.03 Thousand/uL      Lymphocytes Absolute 1.50 Thousands/µL Monocytes Absolute 0.65 Thousand/µL      Eosinophils Absolute 0.13 Thousand/µL      Basophils Absolute 0.05 Thousands/µL     Blood culture #1 [[id number]] Collected: 09/27/23 0845    Lab Status: In process Specimen: Blood from Arm, Right Updated: 09/27/23 0853    Blood culture #2 [[id number]] Collected: 09/27/23 0843    Lab Status: In process Specimen: Blood from Arm, Left Updated: 09/27/23 0852                 No orders to display              Procedures  Procedures         ED Course                               SBIRT 22yo+    Flowsheet Row Most Recent Value   Initial Alcohol Screen: US AUDIT-C     1. How often do you have a drink containing alcohol? 0 Filed at: 09/27/2023 0819   2. How many drinks containing alcohol do you have on a typical day you are drinking? 0 Filed at: 09/27/2023 0819   3a. Male UNDER 65: How often do you have five or more drinks on one occasion? 0 Filed at: 09/27/2023 0819   3b. FEMALE Any Age, or MALE 65+: How often do you have 4 or more drinks on one occassion? 0 Filed at: 09/27/2023 0819   Audit-C Score 0 Filed at: 09/27/2023 9676   DESIRAE: How many times in the past year have you. .. Used an illegal drug or used a prescription medication for non-medical reasons? Never Filed at: 09/27/2023 3098                    Medical Decision Making  Obtain blood work and plan for admission. Start IV antibiotics. Patient refused tetanus during previous ED visits. Patient presenting with a worsening wound infection despite being on 2 different antibiotics on an outpatient basis. At this time patient will be admitted for IV antibiotic therapy. Patient agrees with admission plans. Cellulitis of left upper extremity: acute illness or injury  Cellulitis of right upper extremity: acute illness or injury  Dog bite, initial encounter: acute illness or injury  Amount and/or Complexity of Data Reviewed  External Data Reviewed: notes. Labs: ordered.  Decision-making details documented in ED Course. Risk  Prescription drug management. Decision regarding hospitalization. Disposition  Final diagnoses:   Cellulitis of right upper extremity   Cellulitis of left upper extremity   Dog bite, initial encounter     Time reflects when diagnosis was documented in both MDM as applicable and the Disposition within this note     Time User Action Codes Description Comment    9/27/2023  8:36 AM Philipp Oxford Add [V92.101] Cellulitis of right upper extremity     9/27/2023  8:36 AM Philipp Oxford Add [G01.259] Cellulitis of left upper extremity     9/27/2023  8:36 AM Philipp Littleton Add [A28.0,  W54. 0XXA] Pasteurella cellulitis due to dog bite     9/27/2023  8:36 AM [email or web address] Remove [A28.0,  W54. 0XXA] Pasteurella cellulitis due to dog bite     9/27/2023  8:36 AM Philipp Littleton Add Karlene.Altamirano. 0XXA] Dog bite, initial encounter       ED Disposition     ED Disposition   Admit    Condition   Stable    Date/Time   Wed Sep 27, 2023  8:42 AM    Comment   Case was discussed with Dr. Chuck Busby and the patient's admission status was agreed to be Admission Status: inpatient status to the service of Dr. Chuck Busby. Follow-up Information    None         Patient's Medications   Discharge Prescriptions    No medications on file       No discharge procedures on file.     PDMP Review     None          ED Provider  Electronically Signed by           Philipp Ospina DO  09/27/23 7953

## 2023-09-27 NOTE — H&P
[facility]  H&P  Name: Guadalupe Roque 74 y.o. female I MRN: [id number]  Unit/Bed#: [address] [place] [address] Date of Admission: 9/27/2023   Date of Service: 9/27/2023 I Hospital Day: 0      Assessment/Plan   * Cellulitis of right upper extremity  Assessment & Plan  Pt sustained a dog bite and required suturing. She was given moxifloxacin and flagyl but developed further erythema and discharge. She had cultures sent which revealed group C streotococcus. CT revealed right upper extremity subcutaneous edema consistent with cellulitis including 3 small peripherally enhancing collections concerning for intramuscular abscess formation. · Admit to medicine  · ID consult appreciated  · Continue Rocephin  · Surgery consult for abscesses    Smoking  Assessment & Plan  Smoking cessation  NicoDerm patch         VTE Prophylaxis: Heparin  / sequential compression device   Code Status: Level 1 - Full Code    Anticipated Length of Stay:  Patient will be admitted on an Inpatient basis with an anticipated length of stay of greater than 2 midnights. Justification for Hospital Stay: cellulitis     Total Time for Visit, including Counseling / Coordination of Care: 15 minutes. Greater than 50% of this total time spent on direct patient counseling and coordination of care. Chief Complaint:   Wound Check (Pt returning for wound check and care. Per pt daughter told her to stay and follow staff instructions)      History of Present Illness:    Guadalupe Roque is a 74 y.o. female with a PMH of tobacco dependence who presents with wound infection. Patient was bit by a dog. She went to the ER and was sutured. She was put in on moxifloxacin and Flagyl prophylactically. She came back to the ER for concern of infection but refused admission. She then went to see her primary care provider who advised her to come back to the ER. On arrival to the ER today she was found to have erythema and drainage from her wound. She had a CT of the arm which revealed 3 small abscesses. She is admitted for further management. Review of Systems:    Review of Systems   Constitutional: Negative for chills and fever. HENT: Negative for ear pain and sore throat. Eyes: Negative for pain and visual disturbance. Respiratory: Negative for cough and shortness of breath. Cardiovascular: Negative for chest pain and palpitations. Gastrointestinal: Negative for abdominal pain and vomiting. Genitourinary: Negative for dysuria and hematuria. Musculoskeletal: Negative for arthralgias and back pain. Skin: Positive for wound. Negative for color change and rash. Neurological: Negative for seizures and syncope. All other systems reviewed and are negative. Past Medical and Surgical History:     Past Medical History:   Diagnosis Date   • Hiatal hernia        Past Surgical History:   Procedure Laterality Date   •  SECTION     • HERNIA REPAIR         Meds/Allergies:    Prior to Admission medications    Medication Sig Start Date End Date Taking? Authorizing Provider   metroNIDAZOLE (FLAGYL) 500 mg tablet Take 1 tablet (500 mg total) by mouth every 8 (eight) hours for 10 days 9/25/23 10/5/23  Abby Machado PA-C   moxifloxacin (AVELOX) 400 MG tablet Take 1 tablet (400 mg total) by mouth daily for 7 days 23  Lucila Ramesh DO       Allergies: Allergies   Allergen Reactions   • Asa [Aspirin] Shortness Of Breath   • Clindamycin    • Penicillins    • Tetracycline        Social History:     Marital Status: Unknown   Substance Use History:   Social History     Substance and Sexual Activity   Alcohol Use No     Social History     Tobacco Use   Smoking Status Every Day   • Packs/day: 3.00   • Types: Cigarettes   Smokeless Tobacco Never     Social History     Substance and Sexual Activity   Drug Use No       Family History:    History reviewed. No pertinent family history.     Physical Exam:     Vitals:   Blood Pressure: 166/96 (09/27/23 0817)  Pulse: 90 (09/27/23 0817)  Temperature: 98.8 °F (37.1 °C) (09/27/23 0817)  Temp Source: Oral (09/27/23 0817)  Respirations: 20 (09/27/23 0817)  Height: 4' 11" (149.9 cm) (09/27/23 0817)  Weight - Scale: 41.3 kg (91 lb) (09/27/23 0817)  SpO2: 95 % (09/27/23 0817)    Physical Exam  Vitals and nursing note reviewed. HENT:      Head: Normocephalic. Eyes:      Extraocular Movements: Extraocular movements intact. Cardiovascular:      Rate and Rhythm: Normal rate and regular rhythm. Pulmonary:      Effort: Pulmonary effort is normal.      Breath sounds: Normal breath sounds. Abdominal:      General: Abdomen is flat. Bowel sounds are normal.      Palpations: Abdomen is soft. Musculoskeletal:         General: No swelling. Normal range of motion. Skin:     General: Skin is warm and dry. Comments: See media images    Neurological:      General: No focal deficit present. Mental Status: She is alert and oriented to person, place, and time. Additional Data:     Lab Results: I have personally reviewed pertinent reports. Results from last 7 days   Lab Units 09/27/23  0843   WBC Thousand/uL 7.92   HEMOGLOBIN g/dL 10.9*   HEMATOCRIT % 34.4*   PLATELETS Thousands/uL 305   NEUTROS PCT % 70     Results from last 7 days   Lab Units 09/27/23  0843   SODIUM mmol/L 142   POTASSIUM mmol/L 3.9   CHLORIDE mmol/L 109*   CO2 mmol/L 26   BUN mg/dL 16   CREATININE mg/dL 0.69   CALCIUM mg/dL 9.1                       Imaging: I have personally reviewed pertinent reports. CT upper extremity w contrast right   Final Result by Akil Lara MD (09/27 1513)   Right upper extremity subcutaneous edema consistent with cellulitis including 3 small peripherally enhancing collections concerning for intramuscular abscess formation as described above. No soft tissue emphysema.       Workstation performed: [id number]             CT upper extremity w contrast right   Final Result Right upper extremity subcutaneous edema consistent with cellulitis including 3 small peripherally enhancing collections concerning for intramuscular abscess formation as described above. No soft tissue emphysema. Workstation performed: [id number]             EKG, Pathology, and Other Studies Reviewed on Admission:   · EKG: not done     Allscripts / Epic Records Reviewed: Yes     ** Please Note: This note has been constructed using a voice recognition system.  **

## 2023-09-27 NOTE — CONSULTS
Consultation - Infectious Disease   Samantha Varma 74 y.o. female MRN: [id number]  Unit/Bed#: 7 [facility] 326-01 Encounter: [id number]      IMPRESSION & RECOMMENDATIONS:   1. SIRS, POA with mild tachycardia and leukocytosis. Down trending  -antibiotics as below  -follow-up cultures and adjust antibiotics as needed  -monitor temperature and hemodynamics  -serial exam  -monitor serial CBC and BMP     2. Right arm cellulitis. S/p dog bite and moxifloxacin outpatient x 1 week. Increased pain and drainage brought patient in today. 9/25/23 Wound cx with 2 col Group C Streptococcus  -tolerated Vancomycin and Ceftriaxone in ER.  -Can continue Ceftriaxone 1 g IV daily while in hospital.  -check CT RUE   -if no drainable abscess, can switch to Cefuroxime 500 mg PO BID x 1 week upon discharge.   -follow-up cultures and adjust antibiotics as needed  -monitor temperature and hemodynamics  -serial exam  -recheck CBC and BMP in a.m.     3. Listed childhood PCN allergy, also tetracycline and clindamycin. Tolerated ceftriaxone in ER  -monitor on ceftriaxone    4. Tobacco addiction. Encourage smoking cessation  -symptomatic management per primary care team    Antibiotics:  Vancomycin/Ceftriaxone D1    I have discussed the above management plan in detail with patient, RN, and Francesca Love of the primary service    Extensive review of the medical records in epic including review of the notes, radiographs, and laboratory results     HISTORY OF PRESENT ILLNESS:  Reason for Consult: cellulitis RUE    HPI: Samantha Varma is a 74y.o. year old female with hiatal hernia and s/p repair who presented to the ED for wound check 9/27/23. Patient sustained dog bites from a friend's dog to both hands on 9/22/2023. Patient was seen at her emergency department and had suture repair. Patient was started on prophylactic moxifloxacin/flagyl for possible wound infection.   Patient noted redness and swelling around her wound to bilateral upper extremity R>L.  In ER, heart rate 90, WBC 11.37. Blood cultures obtained. Wound cx 23 growing 2 col Group C Strep. Infectious Disease now being consulted regarding  evaluation and management of cellulitis of RUE. Patient denies any fevers or chills. Both dog bite locations are painful R > L. No CP, SOB, cough, N/V/D or dysuria. REVIEW OF SYSTEMS:  A complete review of systems is negative other than that noted in the HPI. PAST MEDICAL HISTORY:  Past Medical History:   Diagnosis Date   • Hiatal hernia      Past Surgical History:   Procedure Laterality Date   •  SECTION     • HERNIA REPAIR         FAMILY HISTORY:  Non-contributory    SOCIAL HISTORY:  Social History   Social History     Substance and Sexual Activity   Alcohol Use No     Social History     Substance and Sexual Activity   Drug Use No     Social History     Tobacco Use   Smoking Status Every Day   • Packs/day: 3.00   • Types: Cigarettes   Smokeless Tobacco Never       ALLERGIES:  Allergies   Allergen Reactions   • Asa [Aspirin] Shortness Of Breath   • Clindamycin    • Penicillins    • Tetracycline        MEDICATIONS:  All current active medications have been reviewed.     PHYSICAL EXAM:  Temp:  [98.8 °F (37.1 °C)] 98.8 °F (37.1 °C)  HR:  [90] 90  Resp:  [20] 20  BP: (166)/(96) 166/96  SpO2:  [95 %] 95 %  Temp (24hrs), Av.8 °F (37.1 °C), Min:98.8 °F (37.1 °C), Max:98.8 °F (37.1 °C)  Current: Temperature: 98.8 °F (37.1 °C)  No intake or output data in the 24 hours ending 23 1349    General Appearance:  74year old female, ambulatory in room, appearing nontoxic, and in no acute distress   Head:  Normocephalic, without obvious abnormality, atraumatic   Eyes:  Conjunctiva pink and sclera anicteric, both eyes   Nose: Nares normal, mucosa normal, no drainage   Throat: Oropharynx moist without lesions   Neck: Supple, symmetrical, no adenopathy, no tenderness/mass/nodules   Back:   Symmetric, no curvature, ROM normal, no CVA tenderness   Lungs: Clear to auscultation bilaterally, respirations unlabored   Chest Wall:  No tenderness or deformity   Heart:  RRR; no murmur, rub or gallop   Abdomen:   Soft, non-tender, non-distended, positive bowel sounds    Extremities: No cyanosis, clubbing or LE edema   Skin: No rashes. Right forearm tender at oozing sutured bite site          Lymph nodes: Cervical, supraclavicular nodes normal   Neurologic: Alert and oriented times 3, extremity strength 5/5 and symmetric       LABS, IMAGING, & OTHER STUDIES:  Lab Results:  I have personally reviewed pertinent labs. Results from last 7 days   Lab Units 09/27/23  0843 09/25/23  1346   WBC Thousand/uL 7.92 11.37*   HEMOGLOBIN g/dL 10.9* 11.0*   PLATELETS Thousands/uL 305 245     Results from last 7 days   Lab Units 09/27/23  0843 09/25/23  1346   SODIUM mmol/L 142 136   POTASSIUM mmol/L 3.9 3.9   CHLORIDE mmol/L 109* 104   CO2 mmol/L 26 26   BUN mg/dL 16 14   CREATININE mg/dL 0.69 0.61   EGFR ml/min/1.73sq m 85 88   CALCIUM mg/dL 9.1 9.2     Results from last 7 days   Lab Units 09/25/23  1346   GRAM STAIN RESULT  1+ Disintegrating polys  No bacteria seen   WOUND CULTURE  2 colonies Beta Hemolytic Streptococcus Group C*  1+ Growth of                       Imaging Studies: Follow up 9/27/23 CT RUE    Other Studies:   I have personally reviewed pertinent reports.

## 2023-09-27 NOTE — TELEPHONE ENCOUNTER
Ros Alexander calling to let the doctor know she is in the hospital.  FYI    She saw dr. rebekah mata yesterday

## 2023-09-27 NOTE — ASSESSMENT & PLAN NOTE
Pt sustained a dog bite and required suturing. She was given moxifloxacin and flagyl but developed further erythema and discharge. She had cultures sent which revealed group C streotococcus. CT revealed right upper extremity subcutaneous edema consistent with cellulitis including 3 small peripherally enhancing collections concerning for intramuscular abscess formation.   · Admit to medicine  · ID consult appreciated  · Continue Rocephin  · Surgery consult for abscesses

## 2023-09-27 NOTE — ED NOTES
Pt following Dr Suresh Wray out of room yelling "you're not listening to me". Pt attempted to follow  behind nursing station and this Rn stopped pt and informed her that she is not permitted behind nurses station.  Pt not listening to  medical advice and threatening to niya KF then walked out     Barbara Mitchell RN  09/27/23 7481

## 2023-09-27 NOTE — ED PROVIDER NOTES
History  Chief Complaint   Patient presents with   • Wound Check     Patient comes this morning due to swelling to right lower arm where her stitches are from dog bite, complaining of swelling, on flagyl for the dog bite and complaining of everything     49-year-old female presents to the ED for wound check. Patient sustained dog bites from a friend's dog to both hands on 9/22/2023. Patient was seen at her emergency department and had suture repaired. Patient was started on prophylactic moxifloxacin for possible wound infection. Patient noted redness and swelling around her wound to bilateral upper extremity. Patient was seen at our emergency department 2 days ago. In general patient has a very cantankerous demeanor. Patient is very abrasive to staff. Patient was offered admission for IV antibiotics as patient was having increasing swelling to her wounds 2 days ago. Patient was very adamant about not trying anything else except for azithromycin because that is the only antibiotic that works for her and she is not allergic to. At that time patient signed out [address] [place] [address]. Patient was discharged with follow-up to PCP. Patient saw her PCP, Dr. Joaquim Valle, yesterday. Patient states that "Dr. Joaquim Valle did not do anything for me yesterday."  Patient was told to come to the emergency department for any further concerns. In triage today patient was very abrasive to triage nurse. Patient was answering question in an angry demeanor. Patient is extremely angry that her wounds became infected. Patient is adamantly stating to triage nurse that she will not be staying for any admissions. Patient denies any fevers or chills. History provided by:  Patient  Wound Check         Prior to Admission Medications   Prescriptions Last Dose Informant Patient Reported?  Taking?   metroNIDAZOLE (FLAGYL) 500 mg tablet   No Yes   Sig: Take 1 tablet (500 mg total) by mouth every 8 (eight) hours for 10 days   moxifloxacin (AVELOX) 400 MG tablet   No Yes   Sig: Take 1 tablet (400 mg total) by mouth daily for 7 days      Facility-Administered Medications: None       Past Medical History:   Diagnosis Date   • Hiatal hernia        Past Surgical History:   Procedure Laterality Date   •  SECTION     • HERNIA REPAIR         History reviewed. No pertinent family history. I have reviewed and agree with the history as documented. E-Cigarette/Vaping   • E-Cigarette Use Never User      E-Cigarette/Vaping Substances     Social History     Tobacco Use   • Smoking status: Every Day     Packs/day: 3.00     Types: Cigarettes   • Smokeless tobacco: Never   Vaping Use   • Vaping Use: Never used   Substance Use Topics   • Alcohol use: No   • Drug use: No       Review of Systems   Constitutional: Negative for chills and fever. HENT: Negative for ear pain and sore throat. Eyes: Negative for pain and visual disturbance. Respiratory: Negative for cough and shortness of breath. Cardiovascular: Negative for chest pain and palpitations. Gastrointestinal: Negative for abdominal pain and vomiting. Genitourinary: Negative for dysuria and hematuria. Musculoskeletal: Negative for arthralgias and back pain. Skin: Positive for wound. Negative for color change and rash. Neurological: Negative for seizures and syncope. All other systems reviewed and are negative. Physical Exam  Physical Exam  Vitals and nursing note reviewed. Constitutional:       General: She is not in acute distress. Appearance: She is well-developed. HENT:      Head: Normocephalic and atraumatic. Eyes:      Conjunctiva/sclera: Conjunctivae normal.   Cardiovascular:      Rate and Rhythm: Normal rate and regular rhythm. Heart sounds: No murmur heard. Pulmonary:      Effort: Pulmonary effort is normal. No respiratory distress. Breath sounds: Normal breath sounds. Abdominal:      Palpations: Abdomen is soft. Tenderness:  There is no abdominal tenderness. Musculoskeletal:         General: No swelling. Cervical back: Neck supple. Comments: Pulses intact to bilateral upper extremities. Multiple lacerations with sutures in place noted to bilateral forearms. Erythema, warm to touch, and edema noted around sutures that is most prominent to the right forearm region. Skin:     General: Skin is warm and dry. Capillary Refill: Capillary refill takes less than 2 seconds. Neurological:      Mental Status: She is alert. Psychiatric:         Mood and Affect: Mood normal.         Vital Signs  ED Triage Vitals [09/27/23 0715]   Temperature Pulse Respirations Blood Pressure SpO2   98.8 °F (37.1 °C) 90 20 166/96 95 %      Temp Source Heart Rate Source Patient Position - Orthostatic VS BP Location FiO2 (%)   Oral Monitor Lying Left arm --      Pain Score       --           Vitals:    09/27/23 0715 09/27/23 0815   BP: 166/96 166/96   Pulse: 90 90   Patient Position - Orthostatic VS: Lying Lying         Visual Acuity      ED Medications  Medications - No data to display    Diagnostic Studies  Results Reviewed     None                 No orders to display              Procedures  Procedures         ED Course  ED Course as of 09/27/23 0818   Wed Sep 27, 2023   0736 Patient is very cantankerous and refusing to hear about my treatment options today. 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 Patient keeps saying I have multiple antibiotic allergies. Patient was seen by her PCP Dr. Anila Najera yesterday. Patient is angry at Dr. Anila Najera that he did not do anything for her. Patient is refusing to try any other antibiotics.   I told patient that she would need IV antibiotics to treat her wound if she thinks that her wound is getting worse with antibiotics prescribed to her by mouth however patient states that "you not going to play with my body with different antibiotics."  I also discussed with patient how she keeps her arms throughout the day. Patient states that she leaves her arm hanging. I also recommended to patient that she should keep her arms elevated above her heart to reduce the swelling. Patient is very angry that her wounds look infected. I asked her if she would like me to do some blood work and offered admission for IV antibiotics if patient thinks that her wound is becoming worse, patient then proceeded to walk out of the emergency department stating "I don't need a $1,200 bill, I'm leaving."                               SBIRT 22yo+    Flowsheet Row Most Recent Value   Initial Alcohol Screen: US AUDIT-C     1. How often do you have a drink containing alcohol? 0 Filed at: 09/27/2023 0717   2. How many drinks containing alcohol do you have on a typical day you are drinking? 0 Filed at: 09/27/2023 0717   3a. Male UNDER 65: How often do you have five or more drinks on one occasion? 0 Filed at: 09/27/2023 0717   3b. FEMALE Any Age, or MALE 65+: How often do you have 4 or more drinks on one occassion? 0 Filed at: 09/27/2023 0717   Audit-C Score 0 Filed at: 09/27/2023 1166   DESIRAE: How many times in the past year have you. .. Used an illegal drug or used a prescription medication for non-medical reasons? Never Filed at: 09/27/2023 3833                    Medical Decision Making  Patient with very angry affect in the emergency department. Patient is extremely angry that her wounds are infected. I had a long discussion with patient that now she has been on moxifloxacin as well as azithromycin and her wound appears to be red and hot to touch. At this time my recommendation is blood work and IV antibiotics. Patient became extremely angry with this decision. Patient is very cantankerous and refusing to hear about my treatment options today.   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 Patient keeps saying I have multiple antibiotic allergies. Patient was seen by her PCP Dr. Kyra Jay yesterday. Patient is angry at Dr. Kyra Jay that he did not do anything for her. Patient is refusing to try any other antibiotics. I told patient that she would need IV antibiotics to treat her wound if she thinks that her wound is getting worse with antibiotics prescribed to her by mouth however patient states that "you not going to play with my body with different antibiotics."  I also discussed with patient how she keeps her arms throughout the day. Patient states that she leaves her arm hanging. I also recommended to patient that she should keep her arms elevated above her heart to reduce the swelling. Patient is very angry that her wounds look infected. I asked her if she would like me to do some blood work and offered admission for IV antibiotics if patient thinks that her wound is becoming worse, patient then proceeded to walk out of the emergency department stating "I don't need a $1,200 bill, I'm leaving."  At this time patient is discharged [facility] with continuation of her current medications and follow-up to her PCP. Can signed AMA form from the waiting room.         Disposition  Final diagnoses:   Visit for wound check   Cellulitis of right upper extremity     Time reflects when diagnosis was documented in both MDM as applicable and the Disposition within this note     Time User Action Codes Description Comment    9/27/2023  7:38 AM Marianna Nation [Z51.89] Visit for wound check     9/27/2023  7:40 AM Marianna Nation [O97.482] Cellulitis of right upper extremity       ED Disposition     ED Disposition   AMA    Condition   --    Date/Time   Wed Sep 27, 2023  7:39 AM    Comment   Date: 9/27/2023  Patient: Guadalupe Roque  Admitted: 9/27/2023  7:06 AM  Attending Provider: DO Nini Cartwrightpalmer Caban or her authorized caregiver has made the decision for the patient to leave the emergency department against the advi ce of the emergency department staff. She or her authorized caregiver has been informed and understands the inherent risks, including death, stable. She or her authorized caregiver has decided to accept the responsibility for this decision. Anne Marie Caban and all necessary parties have been advised that she may return for further evaluation or treatment. Her condition at time of discharge was stable. Anne Marie Caban had current vital signs as follows:  /96 (BP Location: Left arm)   P ulse 90   Temp 98.8 °F (37.1 °C) (Oral)   Resp 20   Ht 4' 11" (1.499 m)   Wt 41.3 kg (91 lb)            Follow-up Information     Follow up With Specialties Details Why Contact Info    Gonzalo Umanzor MD Internal Medicine Schedule an appointment as soon as possible for a visit in 1 day  [address] [place] [address]  [phone]            Discharge Medication List as of 9/27/2023  7:41 AM      CONTINUE these medications which have NOT CHANGED    Details   metroNIDAZOLE (FLAGYL) 500 mg tablet Take 1 tablet (500 mg total) by mouth every 8 (eight) hours for 10 days, Starting Mon 9/25/2023, Until Thu 10/5/2023, Normal      moxifloxacin (AVELOX) 400 MG tablet Take 1 tablet (400 mg total) by mouth daily for 7 days, Starting Fri 9/22/2023, Until Fri 9/29/2023, Normal             No discharge procedures on file.     PDMP Review     None          ED Provider  Electronically Signed by           Rajat Morrison DO  09/27/23 0313

## 2023-09-28 ENCOUNTER — ANESTHESIA EVENT (INPATIENT)
Dept: PERIOP | Facility: HOSPITAL | Age: 73
DRG: 603 | End: 2023-09-28
Payer: MEDICARE

## 2023-09-28 ENCOUNTER — ANESTHESIA (INPATIENT)
Dept: PERIOP | Facility: HOSPITAL | Age: 73
DRG: 603 | End: 2023-09-28
Payer: MEDICARE

## 2023-09-28 LAB
ANION GAP SERPL CALCULATED.3IONS-SCNC: 3 MMOL/L
BUN SERPL-MCNC: 13 MG/DL (ref 5–25)
CALCIUM SERPL-MCNC: 8.5 MG/DL (ref 8.4–10.2)
CHLORIDE SERPL-SCNC: 110 MMOL/L (ref 96–108)
CO2 SERPL-SCNC: 27 MMOL/L (ref 21–32)
CREAT SERPL-MCNC: 0.61 MG/DL (ref 0.6–1.3)
ERYTHROCYTE [DISTWIDTH] IN BLOOD BY AUTOMATED COUNT: 14.5 % (ref 11.6–15.1)
GFR SERPL CREATININE-BSD FRML MDRD: 88 ML/MIN/1.73SQ M
GLUCOSE SERPL-MCNC: 90 MG/DL (ref 65–140)
HCT VFR BLD AUTO: 29.5 % (ref 34.8–46.1)
HGB BLD-MCNC: 9.5 G/DL (ref 11.5–15.4)
MAGNESIUM SERPL-MCNC: 2.2 MG/DL (ref 1.9–2.7)
MCH RBC QN AUTO: 23.9 PG (ref 26.8–34.3)
MCHC RBC AUTO-ENTMCNC: 32.2 G/DL (ref 31.4–37.4)
MCV RBC AUTO: 74 FL (ref 82–98)
PLATELET # BLD AUTO: 227 THOUSANDS/UL (ref 149–390)
PMV BLD AUTO: 8.7 FL (ref 8.9–12.7)
POTASSIUM SERPL-SCNC: 4.2 MMOL/L (ref 3.5–5.3)
RBC # BLD AUTO: 3.98 MILLION/UL (ref 3.81–5.12)
SODIUM SERPL-SCNC: 140 MMOL/L (ref 135–147)
WBC # BLD AUTO: 5.91 THOUSAND/UL (ref 4.31–10.16)

## 2023-09-28 PROCEDURE — 0HBDXZZ EXCISION OF RIGHT LOWER ARM SKIN, EXTERNAL APPROACH: ICD-10-PCS | Performed by: INTERNAL MEDICINE

## 2023-09-28 PROCEDURE — 83735 ASSAY OF MAGNESIUM: CPT | Performed by: NURSE PRACTITIONER

## 2023-09-28 PROCEDURE — 99233 SBSQ HOSP IP/OBS HIGH 50: CPT | Performed by: INTERNAL MEDICINE

## 2023-09-28 PROCEDURE — 99254 IP/OBS CNSLTJ NEW/EST MOD 60: CPT | Performed by: ORTHOPAEDIC SURGERY

## 2023-09-28 PROCEDURE — 25028 I&D F/ARM&/WRST DP ABSC/HMTM: CPT | Performed by: ORTHOPAEDIC SURGERY

## 2023-09-28 PROCEDURE — 87205 SMEAR GRAM STAIN: CPT | Performed by: ORTHOPAEDIC SURGERY

## 2023-09-28 PROCEDURE — 85027 COMPLETE CBC AUTOMATED: CPT | Performed by: NURSE PRACTITIONER

## 2023-09-28 PROCEDURE — 80048 BASIC METABOLIC PNL TOTAL CA: CPT | Performed by: NURSE PRACTITIONER

## 2023-09-28 PROCEDURE — 25028 I&D F/ARM&/WRST DP ABSC/HMTM: CPT

## 2023-09-28 PROCEDURE — 87075 CULTR BACTERIA EXCEPT BLOOD: CPT | Performed by: ORTHOPAEDIC SURGERY

## 2023-09-28 PROCEDURE — 99232 SBSQ HOSP IP/OBS MODERATE 35: CPT | Performed by: NURSE PRACTITIONER

## 2023-09-28 PROCEDURE — 87070 CULTURE OTHR SPECIMN AEROBIC: CPT | Performed by: ORTHOPAEDIC SURGERY

## 2023-09-28 RX ORDER — LABETALOL HYDROCHLORIDE 5 MG/ML
INJECTION, SOLUTION INTRAVENOUS AS NEEDED
Status: DISCONTINUED | OUTPATIENT
Start: 2023-09-28 | End: 2023-09-28

## 2023-09-28 RX ORDER — PROPOFOL 10 MG/ML
INJECTION, EMULSION INTRAVENOUS AS NEEDED
Status: DISCONTINUED | OUTPATIENT
Start: 2023-09-28 | End: 2023-09-28

## 2023-09-28 RX ORDER — OXYCODONE HYDROCHLORIDE 5 MG/1
5 TABLET ORAL EVERY 6 HOURS PRN
Status: DISCONTINUED | OUTPATIENT
Start: 2023-09-28 | End: 2023-09-30 | Stop reason: HOSPADM

## 2023-09-28 RX ORDER — MAGNESIUM HYDROXIDE 1200 MG/15ML
LIQUID ORAL AS NEEDED
Status: DISCONTINUED | OUTPATIENT
Start: 2023-09-28 | End: 2023-09-28 | Stop reason: HOSPADM

## 2023-09-28 RX ORDER — HYDROMORPHONE HCL/PF 1 MG/ML
SYRINGE (ML) INJECTION AS NEEDED
Status: DISCONTINUED | OUTPATIENT
Start: 2023-09-28 | End: 2023-09-28

## 2023-09-28 RX ORDER — GLYCOPYRROLATE 0.2 MG/ML
INJECTION INTRAMUSCULAR; INTRAVENOUS AS NEEDED
Status: DISCONTINUED | OUTPATIENT
Start: 2023-09-28 | End: 2023-09-28

## 2023-09-28 RX ORDER — SODIUM CHLORIDE, SODIUM LACTATE, POTASSIUM CHLORIDE, CALCIUM CHLORIDE 600; 310; 30; 20 MG/100ML; MG/100ML; MG/100ML; MG/100ML
100 INJECTION, SOLUTION INTRAVENOUS CONTINUOUS
Status: DISCONTINUED | OUTPATIENT
Start: 2023-09-28 | End: 2023-09-30

## 2023-09-28 RX ORDER — ACETAMINOPHEN 325 MG/1
650 TABLET ORAL EVERY 6 HOURS PRN
Status: DISCONTINUED | OUTPATIENT
Start: 2023-09-28 | End: 2023-09-30 | Stop reason: HOSPADM

## 2023-09-28 RX ORDER — LIDOCAINE HYDROCHLORIDE 10 MG/ML
INJECTION, SOLUTION EPIDURAL; INFILTRATION; INTRACAUDAL; PERINEURAL AS NEEDED
Status: DISCONTINUED | OUTPATIENT
Start: 2023-09-28 | End: 2023-09-28

## 2023-09-28 RX ORDER — ONDANSETRON 2 MG/ML
4 INJECTION INTRAMUSCULAR; INTRAVENOUS ONCE AS NEEDED
Status: DISCONTINUED | OUTPATIENT
Start: 2023-09-28 | End: 2023-09-28 | Stop reason: HOSPADM

## 2023-09-28 RX ORDER — SODIUM CHLORIDE, SODIUM LACTATE, POTASSIUM CHLORIDE, CALCIUM CHLORIDE 600; 310; 30; 20 MG/100ML; MG/100ML; MG/100ML; MG/100ML
INJECTION, SOLUTION INTRAVENOUS CONTINUOUS PRN
Status: DISCONTINUED | OUTPATIENT
Start: 2023-09-28 | End: 2023-09-28

## 2023-09-28 RX ORDER — FENTANYL CITRATE/PF 50 MCG/ML
50 SYRINGE (ML) INJECTION
Status: DISCONTINUED | OUTPATIENT
Start: 2023-09-28 | End: 2023-09-28 | Stop reason: HOSPADM

## 2023-09-28 RX ORDER — CEFAZOLIN SODIUM 1 G/50ML
1000 SOLUTION INTRAVENOUS EVERY 8 HOURS
Status: DISCONTINUED | OUTPATIENT
Start: 2023-09-28 | End: 2023-09-28

## 2023-09-28 RX ORDER — FENTANYL CITRATE 50 UG/ML
INJECTION, SOLUTION INTRAMUSCULAR; INTRAVENOUS AS NEEDED
Status: DISCONTINUED | OUTPATIENT
Start: 2023-09-28 | End: 2023-09-28

## 2023-09-28 RX ORDER — CEFTRIAXONE 2 G/50ML
2000 INJECTION, SOLUTION INTRAVENOUS EVERY 24 HOURS
Status: DISCONTINUED | OUTPATIENT
Start: 2023-09-29 | End: 2023-09-30 | Stop reason: HOSPADM

## 2023-09-28 RX ORDER — LABETALOL HYDROCHLORIDE 5 MG/ML
10 INJECTION, SOLUTION INTRAVENOUS
Status: DISCONTINUED | OUTPATIENT
Start: 2023-09-28 | End: 2023-09-28 | Stop reason: HOSPADM

## 2023-09-28 RX ADMIN — SODIUM CHLORIDE, SODIUM LACTATE, POTASSIUM CHLORIDE, AND CALCIUM CHLORIDE: .6; .31; .03; .02 INJECTION, SOLUTION INTRAVENOUS at 16:20

## 2023-09-28 RX ADMIN — FENTANYL CITRATE 25 MCG: 50 INJECTION, SOLUTION INTRAMUSCULAR; INTRAVENOUS at 16:31

## 2023-09-28 RX ADMIN — PROPOFOL 100 MG: 10 INJECTION, EMULSION INTRAVENOUS at 16:31

## 2023-09-28 RX ADMIN — FENTANYL CITRATE 25 MCG: 50 INJECTION, SOLUTION INTRAMUSCULAR; INTRAVENOUS at 16:38

## 2023-09-28 RX ADMIN — LABETALOL 20 MG/4 ML (5 MG/ML) INTRAVENOUS SYRINGE 5 MG: at 17:12

## 2023-09-28 RX ADMIN — ACETAMINOPHEN 650 MG: 325 TABLET ORAL at 12:17

## 2023-09-28 RX ADMIN — ACETAMINOPHEN 650 MG: 325 TABLET ORAL at 20:43

## 2023-09-28 RX ADMIN — FENTANYL CITRATE 50 MCG: 50 INJECTION INTRAMUSCULAR; INTRAVENOUS at 17:59

## 2023-09-28 RX ADMIN — FENTANYL CITRATE 25 MCG: 50 INJECTION, SOLUTION INTRAMUSCULAR; INTRAVENOUS at 17:08

## 2023-09-28 RX ADMIN — LABETALOL HYDROCHLORIDE 10 MG: 5 INJECTION, SOLUTION INTRAVENOUS at 18:22

## 2023-09-28 RX ADMIN — HYDROMORPHONE HYDROCHLORIDE 0.5 MG: 1 INJECTION, SOLUTION INTRAMUSCULAR; INTRAVENOUS; SUBCUTANEOUS at 17:13

## 2023-09-28 RX ADMIN — CEFTRIAXONE 1000 MG: 1 INJECTION, SOLUTION INTRAVENOUS at 09:41

## 2023-09-28 RX ADMIN — FENTANYL CITRATE 50 MCG: 50 INJECTION INTRAMUSCULAR; INTRAVENOUS at 18:11

## 2023-09-28 RX ADMIN — LIDOCAINE HYDROCHLORIDE 40 MG: 10 INJECTION, SOLUTION EPIDURAL; INFILTRATION; INTRACAUDAL; PERINEURAL at 16:31

## 2023-09-28 RX ADMIN — FENTANYL CITRATE 25 MCG: 50 INJECTION, SOLUTION INTRAMUSCULAR; INTRAVENOUS at 16:57

## 2023-09-28 RX ADMIN — GLYCOPYRROLATE 0.2 MG: 0.2 INJECTION, SOLUTION INTRAMUSCULAR; INTRAVENOUS at 16:42

## 2023-09-28 NOTE — ANESTHESIA POSTPROCEDURE EVALUATION
Post-Op Assessment Note    CV Status:  Stable    Pain management: adequate     Mental Status:  Alert and awake   Hydration Status:  Euvolemic and stable   PONV Controlled:  Controlled   Airway Patency:  Patent      Post Op Vitals Reviewed: Yes      Staff: Anesthesiologist         No notable events documented.     BP  187/89   Temp      Pulse  77   Resp   12   SpO2   99

## 2023-09-28 NOTE — CONSULTS
Consultation - Orthopedics   Perfecto Welsh 74 y.o. female MRN: [id number]  Unit/Bed#: [address] [place] [address] Encounter: [id number]        History of Present Illness   Physician Requesting Consult: Grace Soria, *    Reason for Consult / Principal Problem: Right forearm abscesses   HPI:   Perfecto Welsh is a 74y.o. year old female who sustained dog bites to the right forearm and left elbow region on 9/22/2023. The patient was seen in the emergency department had these wounds sutured. Patient was started on moxifloxacin/Flagyl. The patient was seen in the emergency department again on 9/25/2023 with increased redness and swelling about these wounds. Wound cultures were obtained, however the patient left AGAINST MEDICAL ADVICE. She was then seen by her PCP outpatient who told her to return to the emergency department again for further evaluation. Wound cultures from 9/25/2023 did grow group C streptococcus. The patient notes ongoing pain about the right dorsal forearm, which she does not feel is improving with antibiotics. .  She also notes erythema and swelling in this region. She also complains of some ulnar deviation of her little finger since the time of her injury. She notes the left elbow wounds are doing fine and she notes minimal pain about the left upper extremity. Patient denies any fever/chills. White count was 11.37 on 9/25 but is currently down to 5.9. Patient had a CT of the right forearm yesterday which showed subcutaneous edema consistent with cellulitis including 3 small peripherally enhancing collections concerning for intramuscular abscess. General surgery consultation was placed yesterday, however primary team was informed this general surgery  this morning that orthopedics should be consulted instead. The patient did eat breakfast around 830 this morning.     The patient is quite unhappy with being in the hospital.  She is upset that her cigarettes and Tylenol were taken away from her. The patient notes that she is "not paying for any of this " and has a  involved. Inpatient consult to Orthopedic Surgery  Consult performed by: Brandin Gardiner PA-C  Consult ordered by: ROEL Zhang          Review of Systems   Constitutional: Negative. Negative for chills and fever. HENT: Negative. Negative for ear pain and sore throat. Eyes: Negative. Negative for pain and redness. Respiratory: Negative. Negative for shortness of breath and wheezing. Cardiovascular: Negative for chest pain and palpitations. Gastrointestinal: Negative. Negative for abdominal pain and blood in stool. Endocrine: Negative. Negative for polydipsia and polyuria. Genitourinary: Negative. Negative for difficulty urinating and dysuria. Musculoskeletal:        As noted in HPI   Skin: Negative. Negative for pallor and rash. Neurological: Negative. Negative for dizziness and numbness. Hematological: Negative. Negative for adenopathy. Does not bruise/bleed easily. Psychiatric/Behavioral: Negative. Negative for confusion and suicidal ideas. Historical Information   Past Medical History:   Diagnosis Date   • Arthritis    • Hiatal hernia      Past Surgical History:   Procedure Laterality Date   •  SECTION     • HERNIA REPAIR       Social History   Social History     Substance and Sexual Activity   Alcohol Use Never     Social History     Substance and Sexual Activity   Drug Use No     Social History     Tobacco Use   Smoking Status Every Day   • Packs/day: 3.00   • Years: 50.00   • Total pack years: 150.00   • Types: Cigarettes   Smokeless Tobacco Never     Family History: History reviewed. No pertinent family history.     Meds/Allergies   all current active meds have been reviewed and current meds:   Current Facility-Administered Medications   Medication Dose Route Frequency   • acetaminophen (TYLENOL) tablet 650 mg  650 mg Oral Q6H PRN   • cefTRIAXone (ROCEPHIN) IVPB (premix in dextrose) 1,000 mg 50 mL  1,000 mg Intravenous Q24H   • nicotine (NICODERM CQ) 21 mg/24 hr TD 24 hr patch 1 patch  1 patch Transdermal Daily     Allergies   Allergen Reactions   • Asa [Aspirin] Shortness Of Breath   • Clindamycin    • Penicillins    • Tetracycline        Objective   Vitals: Blood pressure 151/74, pulse 63, temperature 98.2 °F (36.8 °C), temperature source Oral, resp. rate 16, height 4' 11" (1.499 m), weight 41.3 kg (91 lb), last menstrual period 01/01/1998, SpO2 91 %. ,Body mass index is 18.38 kg/m². Invasive Devices     Peripheral Intravenous Line  Duration           Peripheral IV 09/27/23 Right Antecubital 1 day                Physical Exam  Constitutional:       General: She is not in acute distress. Appearance: She is well-developed. HENT:      Head: Normocephalic and atraumatic. Eyes:      General: No scleral icterus. Conjunctiva/sclera: Conjunctivae normal.   Neck:      Vascular: No JVD. Cardiovascular:      Rate and Rhythm: Normal rate. Pulmonary:      Effort: Pulmonary effort is normal. No respiratory distress. Skin:     General: Skin is warm. Neurological:      Mental Status: She is alert and oriented to person, place, and time. Coordination: Coordination normal.          Ortho Exam     Left upper Extremity:  Sutured dog bite antecubital fossa with skin well approximated. No surrounding erythema or significant swelling. Near full ROM of the elbow without pain. Sensation intact left upper extremity. Right upper extremity: 2 sutured linear wounds dorsal forearm  with surrounding erythema, warmth and tenderness. No obvious drainage at this time. Diffuse swelling entire forearm extending into wrist. Patient able to make full fist. Near full ROM of the wrist. Discomfort with wrist flexion. Ulnar deviation of little finger. Sensation intact medial, ulnar, and radial nerve distributions. +EPL, APB, 1st dorsal interosseous.     Lab Results: I have personally reviewed pertinent lab results. CBC:   Lab Results   Component Value Date    WBC 5.91 09/28/2023    HGB 9.5 (L) 09/28/2023    HCT 29.5 (L) 09/28/2023    MCV 74 (L) 09/28/2023     09/28/2023    RBC 3.98 09/28/2023    MCH 23.9 (L) 09/28/2023    MCHC 32.2 09/28/2023    RDW 14.5 09/28/2023    MPV 8.7 (L) 09/28/2023    NRBC 0 09/27/2023     CMP:   Lab Results   Component Value Date     (H) 09/28/2023    CO2 27 09/28/2023    BUN 13 09/28/2023    CREATININE 0.61 09/28/2023    CALCIUM 8.5 09/28/2023    EGFR 88 09/28/2023     PT/INR: No results found for: "PT", "INR"    Imaging Studies: I have personally reviewed pertinent reports. and I have personally reviewed pertinent films in PACS  CT right upper extremity with IV contrast:     INDICATION: dog bite, rule out abscess.     COMPARISON: None.     TECHNIQUE: CT examination of the above was performed. This examination, like all CT scans performed in the [facility], was performed utilizing techniques to minimize radiation dose exposure, including the use of iterative reconstruction   and automated exposure control software. Multiplanar 2D reformatted images were created from the source data.     IV Contrast: 100 mL of iohexol (OMNIPAQUE)     Rad dose  587.75 mGy-cm     FINDINGS:     OSSEOUS STRUCTURES:  No fracture, dislocation or destructive osseous lesion.     VISUALIZED MUSCULATURE:  Unremarkable.     SOFT TISSUES: Right upper extremity subcutaneous edema consistent with nonspecific cellulitis. Multiple regions of skin defect after dog bite and stitching noted dorsally at the forearm and wrist. At the level of the dorsal mid forearm there is a small   peripherally enhancing collection measuring up to 1.2 x 0.9 x 2.1 cm concerning for small abscess best seen on 501/45.  This extends into the extensor musculature in this region.     More distally within the extensor musculature there is a small peripherally enhancing collection measuring up to 0.9 x 0.6 x 1.2 cm well depicted on 3/71.     Finally, there is a small peripherally enhancing collection along the radial soft tissues extending into extensor musculature on 3/143 measuring 1.3 x 1.3 x 2.2 cm.     OTHER PERTINENT FINDINGS:  None.     IMPRESSION:  Right upper extremity subcutaneous edema consistent with cellulitis including 3 small peripherally enhancing collections concerning for intramuscular abscess formation as described above. No soft tissue emphysema. Assessment/Plan     Assessment:  Right forearm dog bites with cellulitis and multiple abscesses. Plan:  As CT did show multiple dorsal forearm abscesses with minimal improvement thus far with antibiotics, the patient will likely require an I&D of this forearm today. I did discuss this procedure at length with the patient. Unfortunately the patient did eat breakfast this morning, and thus we will not be able to perform this until later today, around 1630 at the earliest.  Patient will be n.p.o. for now. Continue IV antibiotics as per infectious disease. Analgesia as needed.   She is not happy that she has to stay in the hospital.       Code Status: Level 1 - Full Code  Advance Directive and Living Will:      Power of :    POLST:

## 2023-09-28 NOTE — PROGRESS NOTES
Progress Note - Infectious Disease   Mandi Delvalle 74 y.o. female MRN: [id number]  Unit/Bed#: [address] [place] [address]- Encounter: [id number]      Impression/Plan:  1. SIRS, POA with mild tachycardia and leukocytosis. Down trending  -antibiotics as below  -follow-up cultures and adjust antibiotics as needed  -monitor temperature and hemodynamics  -serial exam  -monitor serial CBC and BMP      2. Right arm cellulitis. S/p dog bite and moxifloxacin outpatient x 1 week. Increased pain and drainage brought patient in today. 23 Wound cx with 2 col Group C Streptococcus  23 CT RUE: RUE subc edema c/w cellulitis and 3 small peripherally enhancing collections in intramusculature   -Can continue Ceftriaxone 1 g IV daily while in hospital.  -surgery on board  -possible I&D to follow today or tomrrow   -follow-up cultures and adjust antibiotics as needed  -monitor temperature and hemodynamics  -serial exam     3. Listed childhood PCN allergy, also tetracycline and clindamycin. Tolerated ceftriaxone in ER  -monitor on ceftriaxone     4. Tobacco addiction. Encourage smoking cessation  -symptomatic management per primary care team     Antibiotics:  Vancomycin/Ceftriaxone D2     I have discussed the above management plan in detail with patient, RN, surgeon, and Kimi Arboleda of the primary service. Subjective:  Patient has no fever, chills, sweats; no nausea, vomiting, diarrhea; no cough, shortness of breath; + RUE pain at bite sites. No new symptoms. Objective:  Vitals:  Temp:  [97.6 °F (36.4 °C)-98.6 °F (37 °C)] 98.2 °F (36.8 °C)  HR:  [59-64] 63  Resp:  [16-20] 16  BP: (151-170)/(74-80) 151/74  SpO2:  [91 %-96 %] 91 %  Temp (24hrs), Av.1 °F (36.7 °C), Min:97.6 °F (36.4 °C), Max:98.6 °F (37 °C)  Current: Temperature: 98.2 °F (36.8 °C)    Physical Exam:   General Appearance:  74year old female, chronically debilitated, nontoxic, no acute distress.    HEENT: Atraumatic normocephalic   Throat: Oropharynx moist. Pulmonary:   Normal respiratory excursion without accessory muscle use   Cardiac:  RRR   Abdomen:   Soft, non-tender, non-distended   Extremities: No edema   : No norman, no SPT   Psychiatric: Awake, cooperative   Skin: No new rashes. Right arm IV site nontender. Right forearm tender induration at proximal bite sutured site. Labs, Imaging, & Other studies:   All pertinent labs and imaging studies were personally reviewed  Results from last 7 days   Lab Units 09/28/23  0620 09/27/23  0843 09/25/23  1346   WBC Thousand/uL 5.91 7.92 11.37*   HEMOGLOBIN g/dL 9.5* 10.9* 11.0*   PLATELETS Thousands/uL 227 305 245     Results from last 7 days   Lab Units 09/28/23  0620 09/27/23  0843 09/25/23  1346   SODIUM mmol/L 140 142 136   POTASSIUM mmol/L 4.2 3.9 3.9   CHLORIDE mmol/L 110* 109* 104   CO2 mmol/L 27 26 26   BUN mg/dL 13 16 14   CREATININE mg/dL 0.61 0.69 0.61   EGFR ml/min/1.73sq m 88 85 88   CALCIUM mg/dL 8.5 9.1 9.2     Results from last 7 days   Lab Units 09/27/23  0845 09/27/23  0843 09/25/23  1346   BLOOD CULTURE  No Growth at 24 hrs.  No Growth at 24 hrs.  --    GRAM STAIN RESULT   --   --  1+ Disintegrating polys  No bacteria seen   WOUND CULTURE   --   --  2 colonies Beta Hemolytic Streptococcus Group C*  1+ Growth of

## 2023-09-28 NOTE — DISCHARGE INSTR - AVS FIRST PAGE
POSTOPERATIVE INSTRUCTIONS following SHOULDER SURGERY    WOUND CARE:  Keep the dressing clean and dry. Light drainage may occur the first 2 days postop. Dressing can be changed POD #2 (9/30) then as needed   Please call our office ([phone]) if you experience either of the following:  Sudden increase in swelling, redness, or warmth at the surgical site  Excessive incisional drainage that persists beyond the 3rd day after surgery  Oral temperature greater than 101 degrees, not relieved with Tylenol  Shortness of breath, chest pain, nausea, or any other concerning symptoms    ICE:  You may use Ice to help with pain control   Place ice on the operative site for 20 minutes at a time when you are in pain     FOLLOW-UP APPOINTMENT:  Follow up with Dr. Hayden Richards outpatient within 1 week.  Office number below:      Dr. Riaz Kraus MD    [address]    Phone:   [phone]

## 2023-09-28 NOTE — PLAN OF CARE
Problem: SKIN/TISSUE INTEGRITY - ADULT  Goal: Incision(s), wounds(s) or drain site(s) healing without S/S of infection  Description: INTERVENTIONS  - Assess and document dressing, incision, wound bed, drain sites and surrounding tissue  - Provide patient and family education  9/28/2023 0716 by Harmony Hebert RN  Outcome: Progressing     Problem: PAIN - ADULT  Goal: Verbalizes/displays adequate comfort level or baseline comfort level  Description: Interventions:  - Encourage patient to monitor pain and request assistance  - Assess pain using appropriate pain scale  - Administer analgesics based on type and severity of pain and evaluate response  - Implement non-pharmacological measures as appropriate and evaluate response  - Consider cultural and social influences on pain and pain management  - Notify physician/advanced practitioner if interventions unsuccessful or patient reports new pain  9/28/2023 0716 by Harmony Hebert RN  Outcome: Progressing    Problem: INFECTION - ADULT  Goal: Absence or prevention of progression during hospitalization  Description: INTERVENTIONS:  - Assess and monitor for signs and symptoms of infection  - Monitor lab/diagnostic results  - Monitor all insertion sites, i.e. indwelling lines, tubes, and drains  - Monitor endotracheal if appropriate and nasal secretions for changes in amount and color  - [place] appropriate cooling/warming therapies per order  - Administer medications as ordered  - Instruct and encourage patient and family to use good hand hygiene technique  - Identify and instruct in appropriate isolation precautions for identified infection/condition  9/28/2023 0716 by Harmony Hebert RN  Outcome: Progressing     Problem: INFECTION - ADULT  Goal: Absence of fever/infection during neutropenic period  Description: INTERVENTIONS:  - Monitor WBC    9/28/2023 0716 by Harmony Hebert RN  Outcome: Progressing

## 2023-09-28 NOTE — UTILIZATION REVIEW
Initial Clinical Review    Admission: Date/Time/Statement:   Admission Orders (From admission, onward)     Ordered        09/27/23 0842  INPATIENT ADMISSION  Once                      Orders Placed This Encounter   Procedures   • INPATIENT ADMISSION     Standing Status:   Standing     Number of Occurrences:   1     Order Specific Question:   Level of Care     Answer:   Med Surg [16]     Order Specific Question:   Estimated length of stay     Answer:   More than 2 Midnights     Order Specific Question:   Certification     Answer:   I certify that inpatient services are medically necessary for this patient for a duration of greater than two midnights. See H&P and MD Progress Notes for additional information about the patient's course of treatment. ED Arrival Information     Expected   -    Arrival   9/27/2023 07:59    Acuity   Urgent            Means of arrival   Walk-In    Escorted by   Self    Service   Hospitalist    Admission type   Emergency            Arrival complaint   wound eval           Chief Complaint   Patient presents with   • Wound Check     Pt returning for wound check and care. Per pt daughter told her to stay and follow staff instructions       Initial Presentation:   25-year-old female presents to the ED for wound check. Patient sustained dog bites from a friend's dog to both hands on 9/22/2023. Patient was seen at her emergency department and had suture repaired. Patient was started on prophylactic moxifloxacin for possible wound infection. Patient noted redness and swelling around her wound to bilateral upper extremity. Patient was seen at our emergency department 2 days ago. In general patient has a very cantankerous demeanor. Patient is very abrasive to staff. Patient was offered admission for IV antibiotics as patient was having increasing swelling to her wounds 2 days ago.   Patient was very adamant about not trying anything else except for azithromycin because that is the only antibiotic that works for her and she is not allergic to. At that time patient signed out [facility]. Patient was discharged with follow-up to PCP. Patient saw her PCP, Dr. Daylene Bernheim, yesterday. Patient states that "Dr. Daylene Bernheim did not do anything for me yesterday."  Patient was told to come to the emergency department for any further concerns. In triage today patient was very abrasive to triage nurse. Patient was answering question in an angry demeanor. Patient is extremely angry that her wounds became infected. Patient is adamantly stating to triage nurse that she will not be staying for any admissions. Admitted to inpatient status for cellulitis RUE. Started on IVABT.       ED Triage Vitals [09/27/23 0817]   Temperature Pulse Respirations Blood Pressure SpO2   98.8 °F (37.1 °C) 90 20 166/96 95 %      Temp Source Heart Rate Source Patient Position - Orthostatic VS BP Location FiO2 (%)   Oral Monitor Lying Left arm --      Pain Score       No Pain          Wt Readings from Last 1 Encounters:   09/27/23 41.3 kg (91 lb)     Additional Vital Signs:   Date/Time Temp Pulse Resp BP MAP (mmHg) SpO2 O2 Device Patient Position - Orthostatic VS   09/28/23 0720 98.2 °F (36.8 °C) 63 16 151/74 107 91 % None (Room air) Lying   09/27/23 2213 98 °F (36.7 °C) 59 16 153/79 110 96 % None (Room air) Sitting    Patient Position - Orthostatic VS: Patient is sitting up in the bed at 09/27/23 2213   09/27/23 1933 97.6 °F (36.4 °C) 64 18 166/80 114 96 % None (Room air) Lying   09/27/23 1630 98.6 °F (37 °C) 64 20 170/80 -- -- None (Room air) Lying   09/27/23 0858 -- -- -- -- -- -- None (Room air) --   09/27/23 0817 98.8 °F (37.1 °C) 90 20 166/96 -- 95 % None (Room air) Lying       Pertinent Labs/Diagnostic Test Results:   CT upper extremity w contrast right   Final Result  (09/27 1513)   Right upper extremity subcutaneous edema consistent with cellulitis including 3 small peripherally enhancing collections concerning for intramuscular abscess formation as described above. No soft tissue emphysema. Results from last 7 days   Lab Units 09/28/23  0620 09/27/23  0843 09/25/23  1346   WBC Thousand/uL 5.91 7.92 11.37*   HEMOGLOBIN g/dL 9.5* 10.9* 11.0*   HEMATOCRIT % 29.5* 34.4* 34.9   PLATELETS Thousands/uL 227 305 245   NEUTROS ABS Thousands/µL  --  5.56 8.68*     Results from last 7 days   Lab Units 09/28/23  0620 09/27/23  0843 09/25/23  1346   SODIUM mmol/L 140 142 136   POTASSIUM mmol/L 4.2 3.9 3.9   CHLORIDE mmol/L 110* 109* 104   CO2 mmol/L 27 26 26   ANION GAP mmol/L 3 7 6   BUN mg/dL 13 16 14   CREATININE mg/dL 0.61 0.69 0.61   EGFR ml/min/1.73sq m 88 85 88   CALCIUM mg/dL 8.5 9.1 9.2   MAGNESIUM mg/dL 2.2 2.1  --      Results from last 7 days   Lab Units 09/28/23  0620 09/27/23  0843 09/25/23  1346   GLUCOSE RANDOM mg/dL 90 106 114     Results from last 7 days   Lab Units 09/27/23  0845 09/27/23  0843 09/25/23  1346   BLOOD CULTURE  Received in Microbiology Lab. Culture in Progress. Received in Microbiology Lab. Culture in Progress. --    GRAM STAIN RESULT   --   --  1+ Disintegrating polys  No bacteria seen   WOUND CULTURE   --   --  2 colonies Beta Hemolytic Streptococcus Group C*  1+ Growth of     ED Treatment:   Medication Administration from 09/27/2023 0759 to 09/27/2023 1041       Date/Time Order Dose Route Action     09/27/2023 0843 EDT sodium chloride 0.9 % bolus 500 mL 500 mL Intravenous New Bag     09/27/2023 0851 EDT cefTRIAXone (ROCEPHIN) IVPB (premix in dextrose) 1,000 mg 50 mL 1,000 mg Intravenous New Bag     09/27/2023 0952 EDT vancomycin (VANCOCIN) IVPB (premix in dextrose) 1,000 mg 200 mL 1,000 mg Intravenous New Bag        Past Medical History:   Diagnosis Date   • Arthritis    • Hiatal hernia      Present on Admission:  • Smoking      Admitting Diagnosis: Wound infection [T14. 8XXA, L08.9]  Cellulitis of left upper extremity [L03.114]  Cellulitis of right upper extremity [L03.113]  Age/Sex: 74 y.o. female  Admission Orders:  Consult surgery  Scd/foot pumps  Consult ID    Scheduled Medications:  cefTRIAXone, 1,000 mg, Intravenous, Q24H  heparin (porcine), 5,000 Units, Subcutaneous, Q8H [address]  nicotine, 1 patch, Transdermal, Daily    Network Utilization Review Department  ATTENTION: Please call with any questions or concerns to [phone] and carefully listen to the prompts so that you are directed to the right person. All voicemails are confidential.  Sandy Cole all requests for admission clinical reviews, approved or denied determinations and any other requests to dedicated fax number below belonging to the campus where the patient is receiving treatment.  List of dedicated fax numbers for the Facilities:  [place] DENIALS (Administrative/Medical Necessity) [phone] 230[address] [facility] (Maternity/NICU/Pediatrics) [phone]   [address] [place] [address] [phone]   [facility] 1000 [facility] [phone] [address] [phone]   [address] [facility] 1300 [facility] [address] [phone]

## 2023-09-28 NOTE — PLAN OF CARE
Problem: SKIN/TISSUE INTEGRITY - ADULT  Goal: Incision(s), wounds(s) or drain site(s) healing without S/S of infection  Description: INTERVENTIONS  - Assess and document dressing, incision, wound bed, drain sites and surrounding tissue  - Provide patient and family education  Outcome: Progressing     Problem: PAIN - ADULT  Goal: Verbalizes/displays adequate comfort level or baseline comfort level  Description: Interventions:  - Encourage patient to monitor pain and request assistance  - Assess pain using appropriate pain scale  - Administer analgesics based on type and severity of pain and evaluate response  - Implement non-pharmacological measures as appropriate and evaluate response  - Consider cultural and social influences on pain and pain management  - Notify physician/advanced practitioner if interventions unsuccessful or patient reports new pain  Outcome: Progressing     Problem: INFECTION - ADULT  Goal: Absence or prevention of progression during hospitalization  Description: INTERVENTIONS:  - Assess and monitor for signs and symptoms of infection  - Monitor lab/diagnostic results  - Monitor all insertion sites, i.e. indwelling lines, tubes, and drains  - Monitor endotracheal if appropriate and nasal secretions for changes in amount and color  - [place] appropriate cooling/warming therapies per order  - Administer medications as ordered  - Instruct and encourage patient and family to use good hand hygiene technique  - Identify and instruct in appropriate isolation precautions for identified infection/condition  Outcome: Progressing     Problem: INFECTION - ADULT  Goal: Absence of fever/infection during neutropenic period  Description: INTERVENTIONS:  - Monitor WBC    Outcome: Progressing

## 2023-09-28 NOTE — ANESTHESIA PREPROCEDURE EVALUATION
Procedure:  INCISION AND DRAINAGE (I&D) EXTREMITY (Right: Arm Lower)    Relevant Problems   ANESTHESIA (within normal limits)      CARDIO (within normal limits)      MUSCULOSKELETAL   (+) Primary osteoarthritis of both knees      PULMONARY   (+) Smoking        Physical Exam    Airway    Mallampati score: II  TM Distance: >3 FB  Neck ROM: full     Dental   No notable dental hx     Cardiovascular  Rhythm: regular, Rate: normal,     Pulmonary  Breath sounds clear to auscultation,     Other Findings        Anesthesia Plan  ASA Score- 2 Emergent    Anesthesia Type- general with ASA Monitors. Additional Monitors:   Airway Plan: LMA. Plan Factors-Exercise tolerance (METS): >4 METS. Chart reviewed. EKG reviewed. Existing labs reviewed. Patient summary reviewed. Patient is a current smoker. Patient did not smoke on day of surgery. Obstructive sleep apnea risk education given perioperatively. Induction- intravenous. Postoperative Plan- Plan for postoperative opioid use. Planned trial extubation    Informed Consent- Anesthetic plan and risks discussed with patient. I personally reviewed this patient with the CRNA. Discussed and agreed on the Anesthesia Plan with the CRNA. Alina Cruz

## 2023-09-28 NOTE — ASSESSMENT & PLAN NOTE
Pt sustained a dog bite and required suturing. She was given moxifloxacin and flagyl but developed further erythema and discharge. She had cultures sent which revealed group C streotococcus. CT revealed right upper extremity subcutaneous edema consistent with cellulitis including 3 small peripherally enhancing collections concerning for intramuscular abscess formation.   · ID consult appreciated  · Continue Rocephin  · Surgery consulted 9/27, advised 9/28 to consult ortho  · Ortho consult pending

## 2023-09-28 NOTE — PROGRESS NOTES
[address]  Progress Note  Name: Laila Sin  MRN: [id number]  Unit/Bed#: 3 [facility] 326-01 I Date of Admission: 2023   Date of Service: 2023 I Hospital Day: 1    Assessment/Plan   * Cellulitis of right upper extremity  Assessment & Plan  Pt sustained a dog bite and required suturing. She was given moxifloxacin and flagyl but developed further erythema and discharge. She had cultures sent which revealed group C streotococcus. CT revealed right upper extremity subcutaneous edema consistent with cellulitis including 3 small peripherally enhancing collections concerning for intramuscular abscess formation. · ID consult appreciated  · Continue Rocephin  · Surgery consulted , advised  to consult ortho  · Ortho consult pending     Smoking  Assessment & Plan  Smoking cessation  NicoDerm patch             VTE Pharmacologic Prophylaxis:   Pharmacologic: Heparin  Mechanical VTE Prophylaxis in Place: Yes    Patient Centered Rounds: I have performed bedside rounds with nursing staff today. Discussions with Specialists or Other Care Team Provider: Yes  Education and Discussions with Family / Patient:Yes  Time Spent for Care: 15 minutes. More than 50% of total time spent on counseling and coordination of care as described above. Current Length of Stay: 1 day(s)  Current Patient Status: Inpatient     Discharge Plan: Pending     Code Status: Level 1 - Full Code      Subjective:   Patient seen sitting in bed, she wants to smoke and states she is just miserable being in the hospital.  She reports swelling has improved. Objective:     Vitals:   Temp (24hrs), Av.1 °F (36.7 °C), Min:97.6 °F (36.4 °C), Max:98.6 °F (37 °C)    Temp:  [97.6 °F (36.4 °C)-98.6 °F (37 °C)] 98.2 °F (36.8 °C)  HR:  [59-64] 63  Resp:  [16-20] 16  BP: (151-170)/(74-80) 151/74  SpO2:  [91 %-96 %] 91 %  Body mass index is 18.38 kg/m².      Input and Output Summary (last 24 hours):   No intake or output data in the 24 hours ending 09/28/23 6461     Physical Exam:     Physical Exam  Vitals and nursing note reviewed. HENT:      Head: Normocephalic. Nose: Nose normal.   Eyes:      Extraocular Movements: Extraocular movements intact. Cardiovascular:      Rate and Rhythm: Normal rate and regular rhythm. Pulmonary:      Effort: Pulmonary effort is normal.      Breath sounds: Normal breath sounds. Abdominal:      General: Abdomen is flat. Bowel sounds are normal.      Palpations: Abdomen is soft. Musculoskeletal:         General: Normal range of motion. Skin:     Comments: Edema and erythema improving  2+ pedal pulses  Full ROM to wrist and digits bilaterally    Neurological:      General: No focal deficit present. Mental Status: She is oriented to person, place, and time. Psychiatric:         Mood and Affect: Mood normal.         Behavior: Behavior normal.         Additional Data:     Labs:    Results from last 7 days   Lab Units 09/28/23  0620 09/27/23  0843 09/25/23  1346   WBC Thousand/uL 5.91 7.92 11.37*   HEMOGLOBIN g/dL 9.5* 10.9* 11.0*   HEMATOCRIT % 29.5* 34.4* 34.9   PLATELETS Thousands/uL 227 305 245   NEUTROS PCT %  --  70 77*     Results from last 7 days   Lab Units 09/28/23  0620 09/27/23  0843 09/25/23  1346   SODIUM mmol/L 140 142 136   POTASSIUM mmol/L 4.2 3.9 3.9   CHLORIDE mmol/L 110* 109* 104   CO2 mmol/L 27 26 26   BUN mg/dL 13 16 14   CREATININE mg/dL 0.61 0.69 0.61   CALCIUM mg/dL 8.5 9.1 9.2             No results found for: "HGBA1C"            * I Have Reviewed All Lab Data Listed Above. * Additional Pertinent Lab Tests Reviewed: [address] [place] [address] Admission Reviewed    Imaging:     CT upper extremity w contrast right   Final Result by Justen Gregory MD (09/27 3003)   Right upper extremity subcutaneous edema consistent with cellulitis including 3 small peripherally enhancing collections concerning for intramuscular abscess formation as described above.  No soft tissue emphysema. Workstation performed: [id number]           Imaging Reports Reviewed by myself    Cultures:   Blood Culture:   Lab Results   Component Value Date    BLOODCX Received in Microbiology Lab. Culture in Progress. 09/27/2023    BLOODCX Received in Microbiology Lab. Culture in Progress. 09/27/2023     Urine Culture: No results found for: "URINECX"  Sputum Culture: No components found for: "SPUTUMCX"  Wound Culture:   Lab Results   Component Value Date    WOUNDCULT 2 colonies Beta Hemolytic Streptococcus Group C (A) 09/25/2023    WOUNDCULT 1+ Growth of 09/25/2023       Last 24 Hours Medication List:   Current Facility-Administered Medications   Medication Dose Route Frequency Provider Last Rate   • cefTRIAXone  1,000 mg Intravenous Q24H Quiquefield Quirino PA-C     • heparin (porcine)  5,000 Units Subcutaneous Q8H [facility] ROEL     • nicotine  1 patch Transdermal Daily ROEL Koroma          Today, Patient Was Seen By: ROEL Koroma    ** Please Note: Dragon 360 Dictation voice to text software may have been used in the creation of this document.  **

## 2023-09-29 LAB
ANION GAP SERPL CALCULATED.3IONS-SCNC: 8 MMOL/L
BASOPHILS # BLD AUTO: 0.01 THOUSANDS/ÂΜL (ref 0–0.1)
BASOPHILS NFR BLD AUTO: 0 % (ref 0–1)
BUN SERPL-MCNC: 11 MG/DL (ref 5–25)
CALCIUM SERPL-MCNC: 8.5 MG/DL (ref 8.4–10.2)
CHLORIDE SERPL-SCNC: 102 MMOL/L (ref 96–108)
CO2 SERPL-SCNC: 25 MMOL/L (ref 21–32)
CREAT SERPL-MCNC: 0.64 MG/DL (ref 0.6–1.3)
EOSINOPHIL # BLD AUTO: 0 THOUSAND/ÂΜL (ref 0–0.61)
EOSINOPHIL NFR BLD AUTO: 0 % (ref 0–6)
ERYTHROCYTE [DISTWIDTH] IN BLOOD BY AUTOMATED COUNT: 14.4 % (ref 11.6–15.1)
GFR SERPL CREATININE-BSD FRML MDRD: 87 ML/MIN/1.73SQ M
GLUCOSE SERPL-MCNC: 140 MG/DL (ref 65–140)
HCT VFR BLD AUTO: 31.1 % (ref 34.8–46.1)
HGB BLD-MCNC: 10 G/DL (ref 11.5–15.4)
IMM GRANULOCYTES # BLD AUTO: 0.01 THOUSAND/UL (ref 0–0.2)
IMM GRANULOCYTES NFR BLD AUTO: 0 % (ref 0–2)
LYMPHOCYTES # BLD AUTO: 0.98 THOUSANDS/ÂΜL (ref 0.6–4.47)
LYMPHOCYTES NFR BLD AUTO: 19 % (ref 14–44)
MCH RBC QN AUTO: 24 PG (ref 26.8–34.3)
MCHC RBC AUTO-ENTMCNC: 32.2 G/DL (ref 31.4–37.4)
MCV RBC AUTO: 75 FL (ref 82–98)
MONOCYTES # BLD AUTO: 0.23 THOUSAND/ÂΜL (ref 0.17–1.22)
MONOCYTES NFR BLD AUTO: 5 % (ref 4–12)
NEUTROPHILS # BLD AUTO: 3.92 THOUSANDS/ÂΜL (ref 1.85–7.62)
NEUTS SEG NFR BLD AUTO: 76 % (ref 43–75)
NRBC BLD AUTO-RTO: 0 /100 WBCS
PLATELET # BLD AUTO: 280 THOUSANDS/UL (ref 149–390)
PMV BLD AUTO: 9.1 FL (ref 8.9–12.7)
POTASSIUM SERPL-SCNC: 4.3 MMOL/L (ref 3.5–5.3)
RBC # BLD AUTO: 4.17 MILLION/UL (ref 3.81–5.12)
SODIUM SERPL-SCNC: 135 MMOL/L (ref 135–147)
WBC # BLD AUTO: 5.15 THOUSAND/UL (ref 4.31–10.16)

## 2023-09-29 PROCEDURE — 99232 SBSQ HOSP IP/OBS MODERATE 35: CPT

## 2023-09-29 PROCEDURE — 99024 POSTOP FOLLOW-UP VISIT: CPT

## 2023-09-29 PROCEDURE — 80048 BASIC METABOLIC PNL TOTAL CA: CPT | Performed by: FAMILY MEDICINE

## 2023-09-29 PROCEDURE — 99233 SBSQ HOSP IP/OBS HIGH 50: CPT | Performed by: INTERNAL MEDICINE

## 2023-09-29 PROCEDURE — 85025 COMPLETE CBC W/AUTO DIFF WBC: CPT | Performed by: FAMILY MEDICINE

## 2023-09-29 RX ADMIN — ACETAMINOPHEN 650 MG: 325 TABLET ORAL at 04:40

## 2023-09-29 RX ADMIN — CEFTRIAXONE 2000 MG: 2 INJECTION, SOLUTION INTRAVENOUS at 10:43

## 2023-09-29 RX ADMIN — ACETAMINOPHEN 650 MG: 325 TABLET ORAL at 20:19

## 2023-09-29 NOTE — ASSESSMENT & PLAN NOTE
Pt sustained a dog bite and required suturing. She was given moxifloxacin and flagyl but developed further erythema and discharge. She had cultures sent which revealed group C streotococcus. CT revealed right upper extremity subcutaneous edema consistent with cellulitis including 3 small peripherally enhancing collections concerning for intramuscular abscess formation.   · ID consult appreciated  · Continue Rocephin day #3  · Ortho consult, appreciate input  · POD #1 right forearm incision and drainage performed by Dr. Allen Contreras nonweightbearing to right upper extremity  · Elevate affected extremity  · Follow-up blood cultures

## 2023-09-29 NOTE — NURSING NOTE
Patient refusing RN to start IV fluid infusion. RN educated on importance. Patient still refused. Hospitalist made aware. Will continue to monitor.

## 2023-09-29 NOTE — PROGRESS NOTES
Orthopedics   Sweta Felton 74 y.o. female MRN: [id number]  Unit/Bed#: [address] [place] [address]      Subjective:  74 y. o.female POD #1 right forearm Incision and Drainage performed by Dr. Gamaliel Mcleod. The patient is found resting comfortably in bed. Pt doing well and reports well-controlled pain. She states she is feeling better than yesterday. The patient denies fever, chills, chest pain, shortness of breath, nausea, vomiting, dizziness, or numbness/tingling. No acute overnight events or other complaints at this time. Labs:  0   Lab Value Date/Time    HCT 31.1 (L) 09/29/2023 0448    HCT 29.5 (L) 09/28/2023 0620    HCT 34.4 (L) 09/27/2023 0843    HGB 10.0 (L) 09/29/2023 0448    HGB 9.5 (L) 09/28/2023 0620    HGB 10.9 (L) 09/27/2023 0843    WBC 5.15 09/29/2023 0448    WBC 5.91 09/28/2023 0620    WBC 7.92 09/27/2023 0843       Meds:    Current Facility-Administered Medications:   •  acetaminophen (TYLENOL) tablet 650 mg, 650 mg, Oral, Q6H PRN, Bambi Revmagdielar, DO, 650 mg at 09/29/23 0440  •  cefTRIAXone (ROCEPHIN) IVPB (premix in dextrose) 2,000 mg 50 mL, 2,000 mg, Intravenous, Q24H, Bambi Pereiraar, DO  •  lactated ringers infusion, 100 mL/hr, Intravenous, Continuous, Bambi Revmagdielar, DO  •  nicotine (NICODERM CQ) 21 mg/24 hr TD 24 hr patch 1 patch, 1 patch, Transdermal, Daily, Bambi Revmagdielar, DO  •  oxyCODONE (ROXICODONE) IR tablet 5 mg, 5 mg, Oral, Q6H PRN, Bambi Revankar, DO  •  oxyCODONE (ROXICODONE) split tablet 2.5 mg, 2.5 mg, Oral, Q6H PRN, Bambi Revankar, DO    Blood Culture:   Lab Results   Component Value Date    BLOODCX No Growth at 24 hrs. 09/27/2023       Wound Culture:   Lab Results   Component Value Date    WOUNDCULT 2 colonies Beta Hemolytic Streptococcus Group C (A) 09/25/2023    WOUNDCULT 1+ Growth of 09/25/2023       Ins and Outs:  I/O last 24 hours: In: 950 [P.O.:250;  I.V.:700]  Out: 10 [Blood:10]          Physical Exam:  Vitals:    09/29/23 0430   BP: 138/67   Pulse: 60   Resp: 18   Temp: 98.4 °F (36.9 °C)   SpO2: 92%     right upper extremity  · Dressing clean, dry, intact  · Sensation intact Axillary, Radial, Ulna, Median nerves  · Motor intact to Axillary, Radial, Ulna, Median nerves  · 2+ radial pulse  · Brisk Cap refill  · Musculature soft and compressible    Assessment: 74 y. o.female POD #1 right forearm Incision and Drainage    Plan:   · Up and out of bed  · Non-weight bearing to right upper extremity  · Continue IV antibiotics with Ceftriaxone per primary team and ID input  · WBC 5.15 today  · Continue monitoring culture results  · Continue analgesics as needed per primary team  · Elevate affected extremity  · D/C planning  · Plan to follow up with Dr. Barry Aguirre within 1 week  · Dressings to be changed POD #2 then as needed  · Will continue to assess for acute blood loss anemia        Johnnie Contreras PA-C

## 2023-09-29 NOTE — OP NOTE
OPERATIVE REPORT  PATIENT NAME: Iglesia Ordoñez    :  1950  MRN: [id number]  Pt Location: [facility] OR ROOM 01    SURGERY DATE: 2023    Surgeon(s) and Role:     * Britta Rader MD - Primary     * Lucas Solorio PA-C - Assisting    The presence of Manuel Humphreys PA-C was required for poisitioning, retraction, assistance, and closure. No qualified resident was available. Preop Diagnosis:  Abscess of right forearm [L02.413]    Post-Op Diagnosis Codes:     * Abscess of right forearm [L02.413]    Procedure(s):  Right - INCISION AND DRAINAGE (I&D) EXTREMITY    Specimen(s):  ID Type Source Tests Collected by Time Destination   A : Right arm wound set #1 Wound Arm, Right ANAEROBIC CULTURE AND GRAM STAIN, WOUND CULTURE Britta Rader MD 2023    B : Right arm wound set #2 Wound Arm, Right ANAEROBIC CULTURE AND GRAM STAIN, WOUND CULTURE Britta Rader MD 2023 1705    C : Right arm wound set #3 Wound Arm, Right ANAEROBIC CULTURE AND GRAM STAIN, WOUND CULTURE Britta Rader MD 2023        Estimated Blood Loss:   10 mL    Drains:  * No LDAs found *    Anesthesia Type:   General    Operative Indications:  13-year-old female who presented after a dog bite to the right forearm. This was closed at bedside in the emergency department. She did leave initially [address] [place] [address]. She did present back to the hospital yesterday. Despite being on antibiotics for some time her swelling in her arm significantly improved but she continued to have several areas of focal erythema and swelling adjacent to the previously closed bite areas. CT scan showed 3 small likely abscesses. Given her inability to clear the infection with nonsurgical antibiotics and the 3 collections visualized on the CT scan I recommended surgical intervention to include right arm incision and debridement. She did have some difficulty extending and abducting her small finger.   Exam did not clearly show evidence of an extensor tendon injury however I did explain that given her heavy smoking and active infection I would not recommend performing any tendon repair if I encountered a tendon tear. We discussed the risk benefits and alternatives of surgery. She elected to proceed and signed consent. Operative Findings: Purulent fluid encountered deep to each previously closed bite laceration. There is no necrotic tissue. The purulent fluid did track deep to the fascia however. There was also dissection from the traumatic injury through the fascia and compartments of the extensor tendons. I did not encounter any torn or injured tendons however. Complications:   None    Procedure Details   The patient was seen in the Holding Room. The risks, benefits, complications, treatment options, and expected outcomes were discussed with the patient. The risks and potential complications of their problem and purposed treatment including but not limited to failure to fully relieve pain,persistent infection, neurovascular compromise, complications from anesthesia, stiffness, persistent pain and failure of surgery with continued pain. The patient concurred with the proposed plan, giving informed consent. The site of surgery properly noted/marked. The patient was brought to the operating room, placed on the operating table in a supine position. After administration of anesthesia and intravenous antibiotics, the patient was positioned on the Operating Room table in the supine position with a radiolucent arm board. The Right arm and hand were prepped and draped the in the usual sterile fashion. A arm was held elevated to allow gravity to exsanguinate the limb for 3 minutes. After this tourniquet was inflated. I then remove the sutures from the 3 previously closed traumatic lacerations. After removing the sutures I opened each incision encountering purulent fluid at each site.   I collected cultures at all 3 different wounds and sent them to the lab. I dissected deeply within each site and found that there was extension of the purulent fluid past the fascia. There was also significant dissection between the extensor tendons and muscles related to the traumatic injury directly. The space between both dorsal lacerations communicated under the dermis above the fascia. There was a small skin bridge that remained well perfused however. Using digital dissection I followed the dissected areas attempting to decompress any additional fluid collections that were present. I did not encounter any necrotic tissue. I did not debride any deep tissue. The muscles were all viable and contractile. I did not encounter any torn extensor tendons. After ensuring that all purulent fluid was decompressed, I irrigated the wounds with 3 L of normal saline. After copious irrigation there was no additional purulent fluid. Deflated the tourniquet and ensured hemostasis. The total tourniquet time was 15 minutes. Prior to proceeding with skin closure I used a 15 blade to debride roughly 1 mm of skin from the edges of all wounds as this was friable and of poor quality I believe there is better healing potential after debriding this unhealthy skin. I then closed all the wounds using interrupted simple nylon sutures. Then placed a sterile dressing and Ace wrap. The procedure was well-tolerated with no apparent complications. The patient emerged from anesthesia was taken to the recovery room in stable condition.           SIGNATURE: Norma Hull MD  DATE: September 28, 2023  TIME: 9:42 PM

## 2023-09-29 NOTE — PLAN OF CARE
Problem: PAIN - ADULT  Goal: Verbalizes/displays adequate comfort level or baseline comfort level  Description: Interventions:  - Encourage patient to monitor pain and request assistance  - Assess pain using appropriate pain scale  - Administer analgesics based on type and severity of pain and evaluate response  - Implement non-pharmacological measures as appropriate and evaluate response  - Consider cultural and social influences on pain and pain management  - Notify physician/advanced practitioner if interventions unsuccessful or patient reports new pain  Outcome: Progressing     Problem: SKIN/TISSUE INTEGRITY - ADULT  Goal: Incision(s), wounds(s) or drain site(s) healing without S/S of infection  Description: INTERVENTIONS  - Assess and document dressing, incision, wound bed, drain sites and surrounding tissue  - Provide patient and family education  - Perform skin care/dressing changes  Outcome: Progressing     Problem: INFECTION - ADULT  Goal: Absence or prevention of progression during hospitalization  Description: INTERVENTIONS:  - Assess and monitor for signs and symptoms of infection  - Monitor lab/diagnostic results  - Monitor all insertion sites, i.e. indwelling lines  - Administer medications as ordered  - Instruct and encourage patient and family to use good hand hygiene technique  - Identify and instruct in appropriate isolation precautions for identified infection/condition  Outcome: Progressing

## 2023-09-29 NOTE — PROGRESS NOTES
Progress Note - Infectious Disease   Dean Guaman 74 y.o. female MRN: [id number]  Unit/Bed#: 7 [facility] 326-01 Encounter: [id number]      Impression/Plan:  1. SIRS, POA with mild tachycardia and leukocytosis. Down trending  -antibiotics as below  -follow-up cultures and adjust antibiotics as needed  -monitor temperature and hemodynamics  -serial exam  -monitor serial CBC and BMP      2. Right arm cellulitis/abscesses. S/p dog bite and moxifloxacin outpatient x 1 week. Increased pain and drainage brought patient in today. 9/25/23 Wound cx with 2 col Group C Streptococcus  9/27/23 CT RUE: RUE subc edema c/w cellulitis and 3 small peripherally enhancing collections in intramusculature   9/28/23 s/p right forearm abscesses with purulent fluid encountered deep to fascia. Cxs pending  -continues Ceftriaxone 2 g IV daily while in hospital.  -surgery on board  -follow-up cultures and adjust antibiotics as needed  -monitor temperature and hemodynamics  -serial exam  -anticipate transition cefuroxime 500 mg PO BID to complete 3 week course from I&D through 10/19/23  -check CBC with diff, creatinine weekly x 2 outpatient  -no formal ID outpatient follow up needed     3. Listed childhood PCN allergy, also tetracycline and clindamycin. Tolerated ceftriaxone in ER  -monitor on ceftriaxone     4. Tobacco addiction. Encourage smoking cessation  -symptomatic management per primary care team     Antibiotics:  Ceftriaxone D3 - POD #1     I have discussed the above management plan in detail with patient, RN, and Jake of the primary service.   I have spent a total time of 50 minutes on 09/29/23 in caring for this patient including Diagnostic results, Prognosis, Risks and benefits of tx options, Instructions for management, Patient and family education, Importance of tx compliance, Risk factor reductions, Impressions, Counseling / Coordination of care, Documenting in the medical record, Reviewing / ordering tests, medicine, procedures  , Obtaining or reviewing history   and Communicating with other healthcare professionals . Subjective:  Patient has no fever, chills, sweats; no nausea, vomiting, diarrhea; no cough, shortness of breath; + RUE pain at bite sites being managed post op. No new symptoms. Objective:  Vitals:  Temp:  [97.7 °F (36.5 °C)-98.4 °F (36.9 °C)] 97.7 °F (36.5 °C)  HR:  [56-75] 60  Resp:  [18-20] 18  BP: (118-197)/(63-88) 141/65  SpO2:  [90 %-99 %] 97 %  Temp (24hrs), Av.1 °F (36.7 °C), Min:97.7 °F (36.5 °C), Max:98.4 °F (36.9 °C)  Current: Temperature: 97.7 °F (36.5 °C)    Physical Exam:   General Appearance:  74year old female, chronically debilitated, nontoxic, no acute distress. Ambulatory with arm down   HEENT: Atraumatic normocephalic   Throat: Oropharynx moist.    Pulmonary:   Normal respiratory excursion without accessory muscle use   Cardiac:  RRR   Abdomen:   Soft, non-tender, non-distended   Extremities: No edema   : No norman, no SPT   Psychiatric: Awake, cooperative   Skin: No new rashes. Left hand IV site nontender. Right forearm tender post op, splint/ace wrap in place with 1+ edema proximal RUE were splint ends. Labs, Imaging, & Other studies:   All pertinent labs and imaging studies were personally reviewed  Results from last 7 days   Lab Units 23  0623  0843   WBC Thousand/uL 5.15 5.91 7.92   HEMOGLOBIN g/dL 10.0* 9.5* 10.9*   PLATELETS Thousands/uL 280 227 305     Results from last 7 days   Lab Units 23  0620 23  0843   SODIUM mmol/L 135 140 142   POTASSIUM mmol/L 4.3 4.2 3.9   CHLORIDE mmol/L 102 110* 109*   CO2 mmol/L 25 27 26   BUN mg/dL 11 13 16   CREATININE mg/dL 0.64 0.61 0.69   EGFR ml/min/1.73sq m 87 88 85   CALCIUM mg/dL 8.5 8.5 9.1     Results from last 7 days   Lab Units 23  1706 23  1705 23  1703 23  0845 23  0843 23  1346   BLOOD CULTURE   --   --   --  No Growth at 24 hrs.  No Growth at 24 hrs.  --    GRAM STAIN RESULT  No Polys or Bacteria seen No Polys or Bacteria seen No Polys or Bacteria seen  --   --  1+ Disintegrating polys  No bacteria seen   WOUND CULTURE  No growth  --  No growth  --   --  2 colonies Beta Hemolytic Streptococcus Group C*  1+ Growth of

## 2023-09-29 NOTE — PROGRESS NOTES
[id number] [facility]  Progress Note  Name: Sophia Price  MRN: [id number]  Unit/Bed#: 3 [address] [facility] [place] 326-01 I Date of Admission: 9/27/2023   Date of Service: 9/29/2023 I Hospital Day: 2    Assessment/Plan   * Cellulitis of right upper extremity  Assessment & Plan  Pt sustained a dog bite and required suturing. She was given moxifloxacin and flagyl but developed further erythema and discharge. She had cultures sent which revealed group C streotococcus. CT revealed right upper extremity subcutaneous edema consistent with cellulitis including 3 small peripherally enhancing collections concerning for intramuscular abscess formation. · ID consult appreciated  · Continue Rocephin day #3  · Ortho consult, appreciate input  · POD #1 right forearm incision and drainage performed by Dr. Hilaria Sharp nonweightbearing to right upper extremity  · Elevate affected extremity  · Follow-up blood cultures    Smoking  Assessment & Plan  Smoking cessation  NicoDerm patch               VTE Pharmacologic Prophylaxis: VTE Score: 4 Moderate Risk (Score 3-4) - Pharmacological DVT Prophylaxis Ordered: heparin. Patient Centered Rounds: I performed bedside rounds with nursing staff today. Discussions with Specialists or Other Care Team Provider: Yes    Education and Discussions with Family / Patient: Patient    Total Time Spent on Date of Encounter in care of patient: 35 mins. This time was spent on one or more of the following: performing physical exam; counseling and coordination of care; obtaining or reviewing history; documenting in the medical record; reviewing/ordering tests, medications or procedures; communicating with other healthcare professionals and discussing with patient's family/caregivers. Current Length of Stay: 2 day(s)  Current Patient Status: Inpatient   Discharge Plan: Anticipate discharge in 24-48 hrs to home. Code Status: Level 1 - Full Code    Subjective:   Seen and examined at bedside.   Denies any pain.    Objective:     Vitals:   Temp (24hrs), Av.1 °F (36.7 °C), Min:97.7 °F (36.5 °C), Max:98.4 °F (36.9 °C)    Temp:  [97.7 °F (36.5 °C)-98.4 °F (36.9 °C)] 97.7 °F (36.5 °C)  HR:  [56-75] 60  Resp:  [18-20] 18  BP: (118-197)/(63-88) 141/65  SpO2:  [90 %-99 %] 97 %  Body mass index is 18.38 kg/m². Input and Output Summary (last 24 hours): Intake/Output Summary (Last 24 hours) at 2023 1429  Last data filed at 2023 2101  Gross per 24 hour   Intake 950 ml   Output 10 ml   Net 940 ml       Physical Exam:   Physical Exam  Vitals and nursing note reviewed. Constitutional:       General: She is not in acute distress. Appearance: She is well-developed. HENT:      Head: Normocephalic and atraumatic. Eyes:      Conjunctiva/sclera: Conjunctivae normal.   Cardiovascular:      Rate and Rhythm: Normal rate and regular rhythm. Heart sounds: No murmur heard. Pulmonary:      Effort: Pulmonary effort is normal. No respiratory distress. Breath sounds: Normal breath sounds. Abdominal:      Palpations: Abdomen is soft. Tenderness: There is no abdominal tenderness. Musculoskeletal:         General: Tenderness present. Cervical back: Neck supple. Comments: Right upper extremity: Dressing CDI   Skin:     General: Skin is warm and dry. Capillary Refill: Capillary refill takes less than 2 seconds. Neurological:      Mental Status: She is alert.    Psychiatric:         Mood and Affect: Mood normal.          Additional Data:     Labs:  Results from last 7 days   Lab Units 23  0448   WBC Thousand/uL 5.15   HEMOGLOBIN g/dL 10.0*   HEMATOCRIT % 31.1*   PLATELETS Thousands/uL 280   NEUTROS PCT % 76*   LYMPHS PCT % 19   MONOS PCT % 5   EOS PCT % 0     Results from last 7 days   Lab Units 23  0448   SODIUM mmol/L 135   POTASSIUM mmol/L 4.3   CHLORIDE mmol/L 102   CO2 mmol/L 25   BUN mg/dL 11   CREATININE mg/dL 0.64   ANION GAP mmol/L 8   CALCIUM mg/dL 8.5   GLUCOSE RANDOM mg/dL 140                       Lines/Drains:  Invasive Devices     Peripheral Intravenous Line  Duration           Peripheral IV 09/28/23 Left Hand <1 day                      Imaging: No pertinent imaging reviewed. Recent Cultures (last 7 days):   Results from last 7 days   Lab Units 09/28/23  1706 09/28/23  1705 09/28/23  1703 09/27/23  0845 09/27/23  0843 09/25/23  1346   BLOOD CULTURE   --   --   --  No Growth at 48 hrs. No Growth at 48 hrs.  --    GRAM STAIN RESULT  No Polys or Bacteria seen No Polys or Bacteria seen No Polys or Bacteria seen  --   --  1+ Disintegrating polys  No bacteria seen   WOUND CULTURE  No growth No growth No growth  --   --  2 colonies Beta Hemolytic Streptococcus Group C*  1+ Growth of       Last 24 Hours Medication List:   Current Facility-Administered Medications   Medication Dose Route Frequency Provider Last Rate   • acetaminophen  650 mg Oral Q6H PRN Bambi Revankar, DO     • cefTRIAXone  2,000 mg Intravenous Q24H Bambi Revankar, DO 2,000 mg (09/29/23 1043)   • lactated ringers  100 mL/hr Intravenous Continuous Bambi Revankar, DO     • nicotine  1 patch Transdermal Daily Bambi Revankar, DO     • oxyCODONE  5 mg Oral Q6H PRN Bambi Revankar, DO     • oxyCODONE  2.5 mg Oral Q6H PRN Bambi Revankar, DO          Today, Patient Was Seen By: ROEL Otto    **Please Note: This note may have been constructed using a voice recognition system. **

## 2023-09-30 VITALS
HEART RATE: 53 BPM | RESPIRATION RATE: 18 BRPM | BODY MASS INDEX: 18.35 KG/M2 | WEIGHT: 91 LBS | OXYGEN SATURATION: 95 % | DIASTOLIC BLOOD PRESSURE: 73 MMHG | HEIGHT: 59 IN | TEMPERATURE: 97 F | SYSTOLIC BLOOD PRESSURE: 128 MMHG

## 2023-09-30 LAB
FLUAV RNA RESP QL NAA+PROBE: NEGATIVE
FLUBV RNA RESP QL NAA+PROBE: NEGATIVE
RSV RNA RESP QL NAA+PROBE: NEGATIVE
SARS-COV-2 RNA RESP QL NAA+PROBE: NEGATIVE

## 2023-09-30 PROCEDURE — 99239 HOSP IP/OBS DSCHRG MGMT >30: CPT | Performed by: INTERNAL MEDICINE

## 2023-09-30 PROCEDURE — 99024 POSTOP FOLLOW-UP VISIT: CPT | Performed by: PHYSICIAN ASSISTANT

## 2023-09-30 PROCEDURE — 0241U HB NFCT DS VIR RESP RNA 4 TRGT: CPT | Performed by: INTERNAL MEDICINE

## 2023-09-30 RX ORDER — CEFUROXIME AXETIL 500 MG/1
500 TABLET ORAL EVERY 12 HOURS SCHEDULED
Qty: 16 TABLET | Refills: 0 | Status: SHIPPED | OUTPATIENT
Start: 2023-10-01 | End: 2023-10-09

## 2023-09-30 RX ADMIN — CEFTRIAXONE 2000 MG: 2 INJECTION, SOLUTION INTRAVENOUS at 08:22

## 2023-09-30 RX ADMIN — ACETAMINOPHEN 650 MG: 325 TABLET ORAL at 02:19

## 2023-09-30 RX ADMIN — ACETAMINOPHEN 650 MG: 325 TABLET ORAL at 08:21

## 2023-09-30 NOTE — PROGRESS NOTES
Orthopedics   Kate Breath 74 y.o. female MRN: [id number]  Unit/Bed#: [address] [place] [address]      Subjective:  74 y. o.female POD #2 right forearm Incision and Drainage performed by Dr. Karson Michael. The patient is found resting comfortably in bed. Pt doing well and reports pain and function are both improved from yesterday. The patient denies fever, chills, chest pain, shortness of breath, nausea, vomiting, dizziness, or numbness/tingling. No acute overnight events or other complaints at this time. No culture results at this time    Labs:  0   Lab Value Date/Time    HCT 31.1 (L) 09/29/2023 0448    HCT 29.5 (L) 09/28/2023 0620    HCT 34.4 (L) 09/27/2023 0843    HGB 10.0 (L) 09/29/2023 0448    HGB 9.5 (L) 09/28/2023 0620    HGB 10.9 (L) 09/27/2023 0843    WBC 5.15 09/29/2023 0448    WBC 5.91 09/28/2023 0620    WBC 7.92 09/27/2023 0843       Meds:    Current Facility-Administered Medications:   •  acetaminophen (TYLENOL) tablet 650 mg, 650 mg, Oral, Q6H PRN, Bambi Revankar, DO, 650 mg at 09/30/23 1354  •  cefTRIAXone (ROCEPHIN) IVPB (premix in dextrose) 2,000 mg 50 mL, 2,000 mg, Intravenous, Q24H, Bambi Revmagdielar, DO, Last Rate: 100 mL/hr at 09/30/23 0822, 2,000 mg at 09/30/23 9821  •  nicotine (NICODERM CQ) 21 mg/24 hr TD 24 hr patch 1 patch, 1 patch, Transdermal, Daily, Bambi Revmagdielar, DO  •  oxyCODONE (ROXICODONE) IR tablet 5 mg, 5 mg, Oral, Q6H PRN, Bambi Revankar, DO  •  oxyCODONE (ROXICODONE) split tablet 2.5 mg, 2.5 mg, Oral, Q6H PRN, Bambi Revankar, DO    Blood Culture:   Lab Results   Component Value Date    BLOODCX No Growth at 48 hrs. 09/27/2023       Wound Culture:   Lab Results   Component Value Date    WOUNDCULT No growth 09/28/2023       Ins and Outs:  No intake/output data recorded. Physical Exam:  Vitals:    09/30/23 0714   BP: 128/73   Pulse: (!) 53   Resp: 18   Temp: (!) 97 °F (36.1 °C)   SpO2: 95%     right upper extremity  · Dressing removed today.  Mild serosanginous drainage on dressings over all 3 incisions. No active drainage. Able to express minimal serous drainage form proximal dorsal incision. No purulence. No fluctuance, induration, or recurrence of abscesses appreciated. Wound edges approximated with nylon suture. All 3 dressings covered with xeroform, 4x4, benedicto, and ACE wrap  · Sensation intact Axillary, Radial, Ulna, Median nerves  · Motor intact to Axillary, Radial, Ulna, Median nerves  · FROM right elbow an wrist without significant pain  · 2+ radial pulse  · Brisk Cap refill  · Musculature soft and compressible    Assessment: 74 y. o.female POD #2 right forearm Incision and Drainage    Plan:   · Up and out of bed  · Non-weight bearing to right upper extremity  · Continue IV antibiotics with Ceftriaxone per primary team and ID input  · Continue monitoring culture results, no growth at this time  · Continue analgesics as needed per primary team  · Elevate affected extremity  · D/C planning  · Plan to follow up with Dr. Lesley Preciado within 1 week  · Dressings to be changed PRN drainage or in 2 days  · Will continue to assess for acute blood loss anemia        Javier Arizmendi PA-C

## 2023-09-30 NOTE — PLAN OF CARE
Problem: SKIN/TISSUE INTEGRITY - ADULT  Goal: Incision(s), wounds(s) or drain site(s) healing without S/S of infection  Description: INTERVENTIONS  - Assess and document dressing, incision, wound bed, drain sites and surrounding tissue  - Provide patient and family education  - Perform skin care/dressing changes as ordered  Outcome: Progressing     Problem: PAIN - ADULT  Goal: Verbalizes/displays adequate comfort level or baseline comfort level  Description: Interventions:  - Encourage patient to monitor pain and request assistance  - Assess pain using appropriate pain scale  - Administer analgesics based on type and severity of pain and evaluate response  - Implement non-pharmacological measures as appropriate and evaluate response  - Consider cultural and social influences on pain and pain management  - Notify physician/advanced practitioner if interventions unsuccessful or patient reports new pain  Outcome: Progressing     Problem: INFECTION - ADULT  Goal: Absence or prevention of progression during hospitalization  Description: INTERVENTIONS:  - Assess and monitor for signs and symptoms of infection  - Monitor lab/diagnostic results  - Monitor all insertion sites, i.e. indwelling lines, tubes, and drains  - Monitor endotracheal if appropriate and nasal secretions for changes in amount and color  - [place] appropriate cooling/warming therapies per order  - Administer medications as ordered  - Instruct and encourage patient and family to use good hand hygiene technique  - Identify and instruct in appropriate isolation precautions for identified infection/condition  Outcome: Progressing  Goal: Absence of fever/infection during neutropenic period  Description: INTERVENTIONS:  - Monitor WBC    Outcome: Progressing     Problem: Nutrition/Hydration-ADULT  Goal: Nutrient/Hydration intake appropriate for improving, restoring or maintaining nutritional needs  Description: Monitor and assess patient's nutrition/hydration status for malnutrition. Collaborate with interdisciplinary team and initiate plan and interventions as ordered. Monitor patient's weight and dietary intake as ordered or per policy. Utilize nutrition screening tool and intervene as necessary. Determine patient's food preferences and provide high-protein, high-caloric foods as appropriate.      INTERVENTIONS:  - Monitor oral intake, urinary output, labs, and treatment plans  - Assess nutrition and hydration status and recommend course of action  - Evaluate amount of meals eaten  - Assist patient with eating if necessary   - Allow adequate time for meals  - Recommend/ encourage appropriate diets, oral nutritional supplements, and vitamin/mineral supplements  - Order, calculate, and assess calorie counts as needed  - Recommend, monitor, and adjust tube feedings and TPN/PPN based on assessed needs  - Assess need for intravenous fluids  - Provide specific nutrition/hydration education as appropriate  - Include patient/family/caregiver in decisions related to nutrition  Outcome: Progressing

## 2023-09-30 NOTE — PLAN OF CARE
Problem: SKIN/TISSUE INTEGRITY - ADULT  Goal: Incision(s), wounds(s) or drain site(s) healing without S/S of infection  Description: INTERVENTIONS  - Assess and document dressing, incision, wound bed, drain sites and surrounding tissue  - Provide patient and family education  - Perform skin care/dressing changes every ***  Outcome: Progressing

## 2023-09-30 NOTE — DISCHARGE SUMMARY
[address] [facility]  Discharge- Sweta Felton 1950, 74 y.o. female MRN: [id number]  Unit/Bed#: 913 [facility] 631-98 Encounter: [id number]  Primary Care Provider: Donovan Scott MD   Date and time admitted to hospital: 9/27/2023  8:16 AM    * Cellulitis of right upper extremity  Assessment & Plan  Pt sustained a dog bite and required suturing. She was given moxifloxacin and flagyl but developed further erythema and discharge. She had cultures sent which revealed group C streotococcus. CT revealed right upper extremity subcutaneous edema consistent with cellulitis including 3 small peripherally enhancing collections concerning for intramuscular abscess formation. · ID and orthopedic surgery consulted while inpatient  · Status post incision and drainage on 9/28  · Treated with IV ceftriaxone while inpatient    · We will need continued wound care on discharge. Case management to arrange wound care clinic appointment for Monday. · Patient to be treated with p.o. Ceftin for an additional 8 days on discharge to complete a 10-day course of antibiotic therapy status post I&D. · Patient to follow-up with orthopedic surgery, Dr. Gamaliel Mcleod, in 1 week.     Smoking  Assessment & Plan  · Cessation advised, patient declined nicotine replacement therapy prescription on discharge    Discharging Physician / Practitioner: Malorie Romero DO  PCP: Donovan Scott MD  Admission Date:   Admission Orders (From admission, onward)     Ordered        09/27/23 0842  INPATIENT ADMISSION  Once                      Discharge Date: 09/30/23    Consultations During Hospital Stay:  · The pubic surgery  · Infectious disease    Procedures Performed:   · Incision and drainage 9/28    Significant Findings / Test Results:   · Cellulitis with subcutaneous abscesses of the right forearm  · Wound culture: Group C strep    Incidental Findings:   · none    Test Results Pending at Discharge (will require follow up): · None     Outpatient Tests Requested:  · none    Complications:  none    Reason for Admission: cellulitis with abscess    Hospital Course:   Merlyn Reina is a 74 y.o. female patient who originally presented to the hospital on 9/27/2023 due to infected wound on the right forearm. Several days prior patient had been bitten by a dog on both forearms. The lacerations to her forearms required stitching bilaterally. She was sent home with moxifloxacin and Flagyl but unfortunately her wounds on the right arm worsened. In the emergency room she was found to have abscesses of the right forearm in addition to her cellulitis. She was admitted to the hospital service. Wound cultures grew group see strep. She was treated with IV ceftriaxone and underwent incision and drainage on 2/90 without complication. On discharge she will continue on p.o. Ceftin to complete a 10-day course of antibiotic therapy status post surgical intervention. She will be following up with orthopedic surgery in 1 week. Case management to arrange outpatient follow-up with wound clinic on Monday for reevaluation. Otherwise all questions and concerns were addressed. Patient was eager to return home. Please see above list of diagnoses and related plan for additional information. Condition at Discharge: good    Discharge Day Visit / Exam:   Subjective: Patient seen and examined on the day of discharge. Feeling well today. Afebrile. No new complaints. Vitals: Blood Pressure: 128/73 (09/30/23 0714)  Pulse: (!) 53 (09/30/23 0714)  Temperature: (!) 97 °F (36.1 °C) (09/30/23 0714)  Temp Source: Oral (09/30/23 0714)  Respirations: 18 (09/30/23 0714)  Height: 4' 11" (149.9 cm) (09/27/23 0817)  Weight - Scale: 41.3 kg (91 lb) (09/27/23 0817)  SpO2: 95 % (09/30/23 0714)    PHYSICAL EXAM:    Vitals signs reviewed  Constitutional   Awake and cooperative. NAD. Head/Neck   Normocephalic. Atraumatic. HEENT   No scleral icterus. EOMI. Heart   Regular rate and rhythm. No murmers. Lungs   Clear to auscultation bilaterally. Respirations unlaboured. Abdomen   Soft. Nontender. Nondistended. Skin   Skin color normal. No rashes. Extremities   No deformities. No peripheral edema. Managing over the right forearm clean and dry. Neuro   Alert and oriented. No new deficits. Psych   Mood stable. Affect normal.         Discussion with Family: Patient declined call to . Discharge instructions/Information to patient and family:   See after visit summary for information provided to patient and family. Provisions for Follow-Up Care:  See after visit summary for information related to follow-up care and any pertinent home health orders. Disposition:   Home    Planned Readmission: No     Discharge Statement:  I spent 40 minutes discharging the patient. This time was spent on the day of discharge. I had direct contact with the patient on the day of discharge. Greater than 50% of the total time was spent examining patient, answering all patient questions, arranging and discussing plan of care with patient as well as directly providing post-discharge instructions. Additional time then spent on discharge activities. Discharge Medications:  See after visit summary for reconciled discharge medications provided to patient and/or family.       **Please Note: This note may have been constructed using a voice recognition system**

## 2023-09-30 NOTE — CASE MANAGEMENT
Case Management Assessment & Discharge Planning Note    Patient name Sushil Hernandez  Location [address] [place] [address] [address]-* MRN [id number]  : 1950 Date 2023       Current Admission Date: 2023  Current Admission Diagnosis:Cellulitis of right upper extremity   Patient Active Problem List    Diagnosis Date Noted   • Dog bite 2023   • Cellulitis of right upper extremity 2023   • Medically noncompliant 2023   • Smoking 2022   • Primary osteoarthritis of both knees 2022   • Mild protein-calorie malnutrition ([address]) 2022      LOS (days): 3  Geometric Mean LOS (GMLOS) (days): 3.20  Days to GMLOS:-0.1     OBJECTIVE:    Risk of Unplanned Readmission Score: 9.11       Current admission status: Inpatient  Referral Reason: Other (Discharge planning)    Preferred Pharmacy:   [address] [place] [address], [address] [address]  Phone: [phone] Fax: [phone]    Primary Care Provider: Colletta Razor, MD    Primary Insurance: MEDICARE  Secondary Insurance: HEALTHCARE ASSISTANCE PENDING    ASSESSMENT:  [place] Proxies    There are no active Health Care Proxies on file.         Readmission Root Cause  30 Day Readmission: No    Patient Information  Admitted from[de-identified] Home  Mental Status: Alert  During Assessment patient was accompanied by: Not accompanied during assessment  Assessment information provided by[de-identified] Patient  Primary Caregiver: Self  Support Systems: Darío  [place] of Residence: [address] [place] [address] do you live in?: [place]  Living Arrangements: Lives Alone    Activities of Daily Living Prior to Admission  Functional Status: Independent  Completes ADLs independently?: Yes  Ambulates independently?: Yes  Does patient use assisted devices?: No  Does patient currently own DME?: No     Patient Information Continued  Income Source: Pension/senior living  Does patient have prescription coverage?: No  Does patient receive dialysis treatments?: No     Means of Transportation  Means of Transport to Appts[de-identified] Drives Self  In the past 12 months, has lack of transportation kept you from medical appointments or from getting medications?: No  In the past 12 months, has lack of transportation kept you from meetings, work, or from getting things needed for daily living?: No  Was application for public transport provided?: N/A      DISCHARGE DETAILS:    Discharge planning discussed with[de-identified] Patient  Freedom of Choice: Yes     Comments - Freedom of Choice: SW spoke with patient at bedside to conduct brief screening and discuss discharge planning. Patient lives alone, is independent with all needs and drives. Her car is in the hospital lot and she plans to drive herself home. SW had been notified by attending that patient needs wound care. Patient has Medicare A only and is not homebound so does not qualify for VNA services. SW spoke with patient with attending present and discussed the need for patient to follow up with the wound care clinic for dressing changes every other day. SW attempted to call the wound care clinic but clinic was closed. SW will contact the clinic on Monday morning to request a same-day appointment. Contact information was also placed on patient's AVS and she was instructed on the clinic's location and the need to follow up. CM contacted family/caregiver?: No- see comments (Patient declined call to family)  Were Treatment Team discharge recommendations reviewed with patient/caregiver?: Yes  Did patient/caregiver verbalize understanding of patient care needs?: Yes  Were patient/caregiver advised of the risks associated with not following Treatment Team discharge recommendations?: Yes    [address]         Is the patient interested in [facility] [address] at discharge?: No    DME Referral Provided  Referral made for DME?: No    Other Referral/Resources/Interventions Provided:  Interventions:  Other (Specify)  Referral Comments: Patient needs appointment at wound care clinic.  SW attempted to call but clinic was closed.   SW will follow up on Monday    Would you like to participate in our [address] service program?  : No - Declined    Treatment Team Recommendation: Home  Discharge Destination Plan[de-identified] Home  Transport at Discharge : Self

## 2023-09-30 NOTE — ASSESSMENT & PLAN NOTE
Pt sustained a dog bite and required suturing. She was given moxifloxacin and flagyl but developed further erythema and discharge. She had cultures sent which revealed group C streotococcus. CT revealed right upper extremity subcutaneous edema consistent with cellulitis including 3 small peripherally enhancing collections concerning for intramuscular abscess formation. · ID and orthopedic surgery consulted while inpatient  · Status post incision and drainage on 9/28  · Treated with IV ceftriaxone while inpatient    · We will need continued wound care on discharge. Case management to arrange wound care clinic appointment for Monday. · Patient to be treated with p.o. Ceftin for an additional 8 days on discharge to complete a 10-day course of antibiotic therapy status post I&D. · Patient to follow-up with orthopedic surgery, Dr. Zan Velez, in 1 week.

## 2023-10-01 LAB
BACTERIA SPEC ANAEROBE CULT: NO GROWTH
BACTERIA WND AEROBE CULT: NO GROWTH
BACTERIA WND AEROBE CULT: NO GROWTH
GRAM STN SPEC: NORMAL
GRAM STN SPEC: NORMAL

## 2023-10-02 ENCOUNTER — OFFICE VISIT (OUTPATIENT)
Dept: OBGYN CLINIC | Facility: CLINIC | Age: 73
End: 2023-10-02

## 2023-10-02 ENCOUNTER — TELEPHONE (OUTPATIENT)
Dept: INTERNAL MEDICINE CLINIC | Facility: CLINIC | Age: 73
End: 2023-10-02

## 2023-10-02 ENCOUNTER — TELEPHONE (OUTPATIENT)
Dept: OBGYN CLINIC | Facility: HOSPITAL | Age: 73
End: 2023-10-02

## 2023-10-02 ENCOUNTER — TELEPHONE (OUTPATIENT)
Age: 73
End: 2023-10-02

## 2023-10-02 VITALS
BODY MASS INDEX: 18.35 KG/M2 | SYSTOLIC BLOOD PRESSURE: 150 MMHG | DIASTOLIC BLOOD PRESSURE: 89 MMHG | HEART RATE: 73 BPM | WEIGHT: 91 LBS | HEIGHT: 59 IN

## 2023-10-02 DIAGNOSIS — T14.8XXA INFECTED WOUND: Primary | ICD-10-CM

## 2023-10-02 DIAGNOSIS — L08.9 INFECTED WOUND: Primary | ICD-10-CM
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
## 2023-10-02 DIAGNOSIS — L03.113 CELLULITIS OF RIGHT UPPER EXTREMITY: Primary | ICD-10-CM

## 2023-10-02 LAB
BACTERIA BLD CULT: NORMAL
BACTERIA BLD CULT: NORMAL
BACTERIA WND AEROBE CULT: NO GROWTH
GRAM STN SPEC: NORMAL

## 2023-10-02 PROCEDURE — 99024 POSTOP FOLLOW-UP VISIT: CPT | Performed by: ORTHOPAEDIC SURGERY

## 2023-10-02 NOTE — TELEPHONE ENCOUNTER
patient calls stating she is coming in to get her bandage change, patient states she was outside the office.

## 2023-10-02 NOTE — TELEPHONE ENCOUNTER
Caller: [facility] Wound Care    Doctor: Shiva Johnston    Reason for call: They are calling to let us know that they do not perform dressing changes. They only treat non-healing open wounds. She will be going to  to get assistance with her dressing change.     Call back#: [phone]

## 2023-10-02 NOTE — TELEPHONE ENCOUNTER
Caller: Jacobo Garner     Doctor: Dilcia Murcia     Reason for call: Patient was seen in office today with Dr. Nneka Palumbo for follow up dog bite. Patient was given several numbers to call in regard to having her wounds cleaned and bandage changed. Not one of these will see her. Patient requesting to come see someone at the office for this .     Please call to advise     Call back#: [phone]

## 2023-10-02 NOTE — TELEPHONE ENCOUNTER
Sent message to [facility] wound center requesting appoint. Was told by RN that because her wound was closed with stitches that they could not see her even though she was recently hospitalized for multiple abscess, which was surgically drained. Spoke with PCP he will do referral for Surgery.

## 2023-10-02 NOTE — TELEPHONE ENCOUNTER
Caller: Patient    Doctor: Kathy Schaefer    Reason for call: Patient went to Wound Care and was told they wouldn't be able to help her as wound was closed w/stitches. Would only help her if wound was open. Patient stated Wound Care advised her to go to urgent care to clean wound. Patient kept reiterating she could not clean wound herself.  Requested call back to discuss her options     Call back#: [phone]

## 2023-10-02 NOTE — TELEPHONE ENCOUNTER
Spoke with patient. Advised patient should follow with General Surgeon since WounD care is not able to. Patient was refusing to take info. Said she would reach out to orthopedic surgeon. Advised to call back to this office with any new concerns. Would be happy to give surgeon name and contact info if she needed it. PCP placed referral any way.

## 2023-10-02 NOTE — PROGRESS NOTES
Assessment/Plan:  1. Cellulitis of right upper extremity  Ambulatory Referral to Wound Care      2. Dog bite, subsequent encounter  Ambulatory Referral to Wound Care        Scribe Attestation    I,:  Susan Crespo am acting as a scribe while in the presence of the attending physician.:       I,:  Shannan Miguel MD personally performed the services described in this documentation    as scribed in my presence.:         Macario Baker upon examination does demonstrate intact incisions of her right forearm. There is no evidence of infection. I did encourage her to continue the course of the antibiotics. Her sutures will remain in place for the time being. She may shower regularly but avoid scrubbing the incision directly. She may then pat the incisions dry and cover with nonadherent pads and an Ace bandage. She will follow-up on Friday 10/13/2023 for repeat evaluation and expected suture removal of the right forearm in addition to suture removal of the left forearm. I did encourage her to call back earlier if she is worsening any new drainage or other signs of infection from the wounds. I encouraged her to continue taking her antibiotics as prescribed. Subjective:   Yoni Avery is a 74 y.o. female who presents for follow up evalaution of the right forearm. She is 4 days status post I&D of her right forearm. She states that she has been compliant with the use of the bandages and has remained covered. She does not appreciate any fevers or chills. She states that she does not appreciate purulence from the incisions. However, she notes that she is unable to see these due to the bandage covers. He does appreciate swelling into the hand that has increased since her surgery. She does not appreciate dense paresthesias of her right hand. She was given antibiotics that she notes that she has been taking. Review of Systems   Constitutional: Negative for chills, fever and unexpected weight change. HENT: Negative for hearing loss, nosebleeds and sore throat. Eyes: Negative for pain, redness and visual disturbance. Respiratory: Negative for cough, shortness of breath and wheezing. Cardiovascular: Negative for chest pain, palpitations and leg swelling. Gastrointestinal: Negative for abdominal pain, nausea and vomiting. Endocrine: Negative for polydipsia and polyuria. Genitourinary: Negative for dysuria and hematuria. Musculoskeletal: Positive for arthralgias and myalgias. See HPI   Skin: Negative for rash and wound. Neurological: Negative for dizziness, numbness and headaches. Psychiatric/Behavioral: Negative for decreased concentration and suicidal ideas. The patient is not nervous/anxious. Past Medical History:   Diagnosis Date   • Arthritis    • Hiatal hernia        Past Surgical History:   Procedure Laterality Date   •  SECTION     • HERNIA REPAIR     • INCISION AND DRAINAGE OF WOUND Right 2023    Procedure: INCISION AND DRAINAGE (I&D) EXTREMITY;  Surgeon: Kiersten Curry MD;  Location: [facility];  Service: Orthopedics       History reviewed. No pertinent family history.     Social History     Occupational History   • Not on file   Tobacco Use   • Smoking status: Every Day     Packs/day: 3.00     Years: 50.00     Total pack years: 150.00     Types: Cigarettes   • Smokeless tobacco: Never   Vaping Use   • Vaping Use: Never used   Substance and Sexual Activity   • Alcohol use: Never   • Drug use: No   • Sexual activity: Not on file         Current Outpatient Medications:   •  cefuroxime (CEFTIN) 500 mg tablet, Take 1 tablet (500 mg total) by mouth every 12 (twelve) hours for 8 days Do not start before 2023., Disp: 16 tablet, Rfl: 0    Allergies   Allergen Reactions   • Asa [Aspirin] Shortness Of Breath   • Clindamycin    • Penicillins    • Tetracycline        Objective:  Vitals:    10/02/23 1147   BP: 150/89   Pulse: 73       Right Hand Exam     Tenderness   Right hand tenderness location: Mild tenderness diffusely about the forearm. Range of Motion   Wrist   Extension: 50   Flexion: 80     Other   Erythema: absent  Scars: present (Surgical incisions of the forearm are clean, dry and intact without signs of purulence)  Sensation: normal  Pulse: present      Left Hand Exam     Comments:  Wound of left forearm is clean, dry and intact            Physical Exam  Vitals and nursing note reviewed. Constitutional:       Appearance: She is well-developed. HENT:      Head: Normocephalic and atraumatic. Right Ear: External ear normal.      Left Ear: External ear normal.      Nose: Nose normal.   Eyes:      General:         Right eye: No discharge. Left eye: No discharge. Conjunctiva/sclera: Conjunctivae normal.   Cardiovascular:      Rate and Rhythm: Normal rate. Pulmonary:      Effort: Pulmonary effort is normal. No respiratory distress. Musculoskeletal:      Cervical back: Normal range of motion and neck supple. Comments: As noted in HPI   Skin:     General: Skin is warm and dry. Neurological:      Mental Status: She is alert and oriented to person, place, and time. Psychiatric:         Behavior: Behavior normal.         Thought Content: Thought content normal.         Judgment: Judgment normal.         I have personally reviewed pertinent films in PACS and my interpretation is as follows: No images reviewed today      This document was created using speech voice recognition software. Grammatical errors, random word insertions, pronoun errors, and incomplete sentences are an occasional consequence of this system due to software limitations, ambient noise, and hardware issues. Any formal questions or concerns about content, text, or information contained within the body of this dictation should be directly addressed to the provider for clarification.

## 2023-10-02 NOTE — TELEPHONE ENCOUNTER
Dr. Rebecca Castillo, I tried to explain below about to keep dry and clean at home, that is all I was able to get out, I was not able to explain the rest. She said I cannot do it myself, and I was trying to advise just to keep clean and dry., I did not get to explain the rest and the patient hung up on me.

## 2023-10-03 NOTE — TELEPHONE ENCOUNTER
Tried calling pt but when I call using [employer] its says number is currently busy and is unable to ring or connect to the pt.

## 2023-10-04 ENCOUNTER — TELEPHONE (OUTPATIENT)
Dept: FAMILY MEDICINE CLINIC | Facility: CLINIC | Age: 73
End: 2023-10-04

## 2023-10-04 NOTE — TELEPHONE ENCOUNTER
Caller: Macario Baker    Doctor: Barry Aguirre     Reason for call: patient needed reassurance before stepping into shower. Patient relayed directions exactly as Rafy Denny instructed her to shower. Patient understood .   No further questions or concerns at this time     Call back#:

## 2023-10-04 NOTE — TELEPHONE ENCOUNTER
Caller: Mesha Castrejon PT    Doctor:     Reason for call: Transferred the call to Ortho     Call back#: N/A

## 2023-10-04 NOTE — TELEPHONE ENCOUNTER
Julita Fountain, Dr Zan Velez! I have been intouch with Abel Bustamante several times today to reinforce your instructions regarding showering and changing right arm dressing. This last time she called me, she was at the office in [place] to get her arm looked at and a dressing change. There was no one there that was willing to help her with this and I was unable to reach anyone either in the clinical area or clerical area after several attempts. (I did get transferred to Podiatry at the [facility] office). The pt states she took a shower and she put the same dressing back on. She states that the arm does not look good and she wants someone to look at it. I offered her an appointment with you on Friday and she declined. She keeps repeating that she is to leave dressing on until appointment with you on 10/13/23 at 0915 and why did you tell her to shower. I asked the pt if she had family that could help her and she said now that they have their own lives. I advised her that the pharmacist could help her get dressing supplies if needed. She said Urgent Care refused to see her and her PCP sent her to the ED. Is it possible to get home health in to see this patient and assess her wounds and perform a dressing change? I am concerned about the patient and the number of times that we have received a call and reiterated and reinforced the same information to her. Thank you.   Joseph Gore RN

## 2023-10-04 NOTE — TELEPHONE ENCOUNTER
Called and spoke w/pt and relayed Dr Kenny Valero to pt and confirmed appt date, time and location of next appt on 10/13/23 at 0915. Reviewed Dr. Li Whelan ov note w/pt regarding showering and changing dsg to right arm; left arm is open to air. She states she is afraid to shower and changing dsg. Reviewed that she can shower, let warm soapy water (she uses Dove soap) run over area, then clean water, pt dry (do not rub area) and then apply non-adhesive dsg followed by ace wrap. She says that Dr Judy yeboah did a great job and incisions look good. She will CB if she has any if she has any question, concerns or difficulty with dsg change, as well will need to notify office to see if someone is available to help her. Pt states she understands.

## 2023-10-04 NOTE — TELEPHONE ENCOUNTER
Received call from pt again, she removed dsg on right arm and states it looks good. She is concerned about taking a shower. Reviewed shower instruction and redressing R arm with pt again for reinforcement. Also advised to continue antibiotics as prescribed. Call with any redness, increase swelling, increase pain, or drainage. Once again, pt states incisions look good and Dr Tamiko Marmolejo did a great job!

## 2023-10-04 NOTE — TELEPHONE ENCOUNTER
Caller: Rhea Uribe     Doctor: Dr Viktoria East     Reason for call:Returning your call     Call back#: [phone]

## 2023-10-04 NOTE — TELEPHONE ENCOUNTER
Caller: Self    Doctor: [place] Crowder     Reason for call: Patient calling to speak with colby Argueta    Call back#: [phone]

## 2023-10-04 NOTE — TELEPHONE ENCOUNTER
Dr. Judy yeboah please review note from [facility] and advise request for possible home services?

## 2023-10-04 NOTE — TELEPHONE ENCOUNTER
Spoke with patient several times in the office. I have attempted multiple times to discuss with her to see a general surgeon regarding her wound. She just does not want to take the information. She wants to allow the ortho surgeon to care for her wound. He is the one who did the surgery. She said that she was given antibiotics at the hospital.     She does not want to set up a follow up with DR Ronnie Lazo either at this time.

## 2023-10-04 NOTE — TELEPHONE ENCOUNTER
Caller: Patient    Doctor/Office: Hilaria Sharp    Call regarding :  Dressing question    Call was transferred to: Milton Cherry

## 2023-10-05 ENCOUNTER — OFFICE VISIT (OUTPATIENT)
Dept: OBGYN CLINIC | Facility: CLINIC | Age: 73
End: 2023-10-05

## 2023-10-05 DIAGNOSIS — L03.113 CELLULITIS OF RIGHT UPPER EXTREMITY: Primary | ICD-10-CM

## 2023-10-05 PROCEDURE — 99024 POSTOP FOLLOW-UP VISIT: CPT | Performed by: ORTHOPAEDIC SURGERY

## 2023-10-13 ENCOUNTER — OFFICE VISIT (OUTPATIENT)
Dept: OBGYN CLINIC | Facility: CLINIC | Age: 73
End: 2023-10-13

## 2023-10-13 VITALS — HEIGHT: 59 IN | BODY MASS INDEX: 18.35 KG/M2 | WEIGHT: 91 LBS

## 2023-10-13 DIAGNOSIS — L03.113 CELLULITIS OF RIGHT UPPER EXTREMITY: Primary | ICD-10-CM
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
## 2023-10-13 PROCEDURE — 99024 POSTOP FOLLOW-UP VISIT: CPT | Performed by: ORTHOPAEDIC SURGERY

## 2023-10-13 NOTE — PROGRESS NOTES
SUBJECTIVE:  Patient is a 74y.o. year old female who presents for follow up now POD 15 s/p Incision And Drainage (i&d) Extremity - Right performed on  9/28/2023. Today patient denies pain and any new injury or trauma. She denies any fever, chills, drainage, warmth, redness, or tenderness around the incisions. The patient does not some altered sensation around the incisions, but denies numbness/tingling in the hand The patient completed her course of oral antibiotics as directed. VITALS:  There were no vitals filed for this visit. PHYSICAL EXAMINATION:  General: well developed and well nourished, alert, oriented times 3, and appears comfortable  Psychiatric: Normal    MUSCULOSKELETAL EXAMINATION:    Incision: Clean, dry, and intact. Scabbing evident on the bilateral forearms over healing dog bites and incisions. No erythema, warmth, or tenderness  Range of Motion: normal elbow, wrist, and finger AROM  Neurovascular status: intact      PLAN:    I believe the patient is continuing to progress appropriately. She can continue activity as tolerated. She can shower, allowing the water to run over the incisions. Continue avoiding complete submersion for 1 month following surgery as the incisions heal. She does not need to cover the incisions after showers, but can use dressings if she prefers and the lacerations are sensitive. Continue OTC medications as needed for pain control. Follow up as needed.

## 2025-03-25 ENCOUNTER — OFFICE VISIT (OUTPATIENT)
Dept: INTERNAL MEDICINE CLINIC | Facility: CLINIC | Age: 75
End: 2025-03-25

## 2025-03-25 VITALS
SYSTOLIC BLOOD PRESSURE: 102 MMHG | HEART RATE: 67 BPM | OXYGEN SATURATION: 95 % | HEIGHT: 59 IN | BODY MASS INDEX: 18.35 KG/M2 | WEIGHT: 91 LBS | DIASTOLIC BLOOD PRESSURE: 70 MMHG | RESPIRATION RATE: 18 BRPM | TEMPERATURE: 97.6 F

## 2025-03-25 DIAGNOSIS — J20.9 ACUTE INFECTIVE TRACHEOBRONCHITIS: Primary | ICD-10-CM

## 2025-03-25 DIAGNOSIS — M17.0 PRIMARY OSTEOARTHRITIS OF BOTH KNEES: ICD-10-CM

## 2025-03-25 DIAGNOSIS — R05.1 ACUTE COUGH: ICD-10-CM

## 2025-03-25 PROCEDURE — 99213 OFFICE O/P EST LOW 20 MIN: CPT | Performed by: INTERNAL MEDICINE

## 2025-03-25 RX ORDER — AZITHROMYCIN 250 MG/1
TABLET, FILM COATED ORAL
Qty: 6 TABLET | Refills: 1 | Status: SHIPPED | OUTPATIENT
Start: 2025-03-25 | End: 2025-03-30

## 2025-03-25 RX ORDER — AZITHROMYCIN 250 MG/1
TABLET, FILM COATED ORAL
Qty: 6 TABLET | Refills: 0 | Status: SHIPPED | OUTPATIENT
Start: 2025-03-25 | End: 2025-03-25

## 2025-03-25 NOTE — PROGRESS NOTES
Name: Meena Bear      : 1950      MRN: [id number]  Encounter Provider: Stefani Patterson MD  Encounter Date: 3/25/2025   Encounter department: [facility] INTERNAL MEDICINE  : Patient refused any blood work    Patient refused any blood work  Assessment & Plan  Acute cough  Refused any blood work    Refused chest x-ray    Smokes 2 packs/day for more than 25 years    Refused CT lung screening    Refuses to the DEXA scan    Refused annual exam annual physical    Refused to be seen by pulmonary    Refused all the recommendation except treatment as follows       Acute infective tracheobronchitis  Symptoms for 5 days started with scratchy throat low-grade fever and body ache lasting only 24 hours.  Subsided nasal congestion now coughing scanty yellow sputum no difficulty breathing no chest pain no other associated symptoms no nausea vomiting    Refused influenza testing    Use COVID testing    Zithromax to use as directed    Robitussin DM 2 teaspoonful 4 times a day    Patient smokes 2 pack/day    Advised to be evaluated by pulmonary for suspected COPD and PFT patient refused    If condition or symptom gets worse must go to the emergency room could be life-threatening explained at length  Orders:  •  azithromycin (Zithromax) 250 mg tablet; Take 2 tablets (500 mg total) by mouth daily for 1 day, THEN 1 tablet (250 mg total) daily for 4 days.           History of Present Illness   URI   Associated symptoms include congestion and coughing. Pertinent negatives include no abdominal pain, chest pain, diarrhea, dysuria, ear pain, headaches, nausea, neck pain, rash, rhinorrhea, sneezing, sore throat, vomiting or wheezing.     Review of Systems   Constitutional:  Positive for activity change. Negative for appetite change, chills, diaphoresis, fatigue, fever and unexpected weight change.   HENT:  Positive for congestion. Negative for dental problem, drooling, ear discharge, ear pain, facial swelling,  "hearing loss, mouth sores, nosebleeds, postnasal drip, rhinorrhea, sinus pressure, sneezing, sore throat, tinnitus, trouble swallowing and voice change.    Eyes:  Negative for photophobia, pain, discharge, redness, itching and visual disturbance.   Respiratory:  Positive for cough. Negative for apnea, choking, chest tightness, shortness of breath, wheezing and stridor.    Cardiovascular:  Negative for chest pain, palpitations and leg swelling.   Gastrointestinal:  Negative for abdominal distention, abdominal pain, anal bleeding, blood in stool, constipation, diarrhea, nausea, rectal pain and vomiting.   Endocrine: Negative for cold intolerance, heat intolerance, polydipsia, polyphagia and polyuria.   Genitourinary:  Negative for decreased urine volume, difficulty urinating, dysuria, enuresis, flank pain, frequency, genital sores, hematuria and urgency.   Musculoskeletal:  Negative for arthralgias, back pain, gait problem, joint swelling, myalgias, neck pain and neck stiffness.   Skin:  Negative for color change, pallor, rash and wound.   Allergic/Immunologic: Negative.  Negative for environmental allergies, food allergies and immunocompromised state.   Neurological:  Negative for dizziness, tremors, seizures, syncope, facial asymmetry, speech difficulty, weakness, light-headedness, numbness and headaches.   Psychiatric/Behavioral:  Negative for agitation, behavioral problems, confusion, decreased concentration, dysphoric mood, hallucinations, self-injury, sleep disturbance and suicidal ideas. The patient is not nervous/anxious and is not hyperactive.        Objective   /70   Pulse 67   Temp 97.6 °F (36.4 °C)   Resp 18   Ht 4' 11\" (1.499 m)   Wt 41.3 kg (91 lb)   LMP 01/01/1998   SpO2 95%   BMI 18.38 kg/m²      Physical Exam  Vitals and nursing note reviewed.   Constitutional:       General: She is not in acute distress.     Appearance: She is well-developed. She is not ill-appearing, toxic-appearing or " diaphoretic.   HENT:      Head: Normocephalic and atraumatic.      Right Ear: External ear normal.      Left Ear: External ear normal.      Nose: Congestion present.      Mouth/Throat:      Pharynx: No oropharyngeal exudate.   Eyes:      General: Lids are normal. Lids are everted, no foreign bodies appreciated. No scleral icterus.        Right eye: No discharge.         Left eye: No discharge.      Conjunctiva/sclera: Conjunctivae normal.      Pupils: Pupils are equal, round, and reactive to light.   Neck:      Thyroid: No thyromegaly.      Vascular: Normal carotid pulses. No carotid bruit, hepatojugular reflux or JVD.      Trachea: No tracheal tenderness or tracheal deviation.   Cardiovascular:      Rate and Rhythm: Normal rate and regular rhythm.      Pulses: Normal pulses.      Heart sounds: Normal heart sounds. No murmur heard.     No friction rub. No gallop.   Pulmonary:      Effort: Pulmonary effort is normal. No respiratory distress.      Breath sounds: No stridor. Rhonchi present. No wheezing or rales.      Comments: Decreased air entry bilateral  Chest:      Chest wall: No tenderness.   Abdominal:      General: Bowel sounds are normal. There is no distension.      Palpations: Abdomen is soft. There is no mass.      Tenderness: There is no abdominal tenderness. There is no guarding or rebound.   Musculoskeletal:         General: No tenderness or deformity. Normal range of motion.      Cervical back: Normal range of motion and neck supple. No edema, erythema or rigidity. No spinous process tenderness or muscular tenderness. Normal range of motion.   Lymphadenopathy:      Head:      Right side of head: No submental, submandibular, tonsillar, preauricular or posterior auricular adenopathy.      Left side of head: No submental, submandibular, tonsillar, preauricular, posterior auricular or occipital adenopathy.      Cervical: No cervical adenopathy.      Right cervical: No superficial, deep or posterior cervical  adenopathy.     Left cervical: No superficial, deep or posterior cervical adenopathy.      Upper Body:      Right upper body: No pectoral adenopathy.      Left upper body: No pectoral adenopathy.   Skin:     General: Skin is warm and dry.      Coloration: Skin is not pale.      Findings: No erythema or rash.   Neurological:      Mental Status: She is alert and oriented to person, place, and time.      Cranial Nerves: No cranial nerve deficit.      Sensory: No sensory deficit.      Motor: No tremor, abnormal muscle tone or seizure activity.      Coordination: Coordination normal.      Gait: Gait normal.      Deep Tendon Reflexes: Reflexes are normal and symmetric. Reflexes normal.   Psychiatric:         Behavior: Behavior normal.         Thought Content: Thought content normal.         Judgment: Judgment normal.

## 2025-03-25 NOTE — PATIENT INSTRUCTIONS
"Patient Education     Acute bronchitis in adults   The Basics   Written by the doctors and editors at [facility]   What is bronchitis? -- This is an infection that causes a cough. It happens when the tubes that carry air into the lungs, called the \"bronchi,\" get infected (figure 1).  Usually, bronchitis happens after a person gets a cold or the flu. The viruses that cause the cold or flu infect the bronchi and irritate them. Antibiotics do not help bronchitis.  Bronchitis can also happen when a person gets an infection called \"whooping cough,\" but this is much less common. Whooping cough is caused by bacteria that can infect the bronchi. Most people get vaccines to prevent whooping cough, but the vaccine doesn't always work. Your doctor will be able to tell if you have whooping cough by doing an exam and listening to your cough.  This article is about \"acute\" bronchitis. This is different from \"chronic\" bronchitis, which is a lung disease that most often affects people who smoke.  What are the symptoms of bronchitis? -- The most common symptoms are:   A cough that can last up to a few weeks   Coughing up mucus that is clear, yellow, or green - Green mucus does not always mean that you have a bacterial infection.  You might also have other cold or flu symptoms, like a stuffy nose, sore throat, or headache. People with bronchitis do not usually get a fever.  Will I need tests? -- Most people with bronchitis do not need a test. But if your doctor or nurse is not sure what is causing your cough, they might do tests. For example, they might order a chest X-ray. Or if they think that you might have COVID-19, they will test you for the virus that causes the infection.  How is bronchitis treated? -- Bronchitis almost always goes away on its own. But the cough can take up to 3 weeks to get better, and sometimes even longer.  Doctors do not usually treat bronchitis with antibiotic medicines. That's because bronchitis is usually " caused by a virus, and antibiotics kill bacteria, not viruses. Also, antibiotics can actually cause other problems.  To feel better, you can treat your cold and flu symptoms. You can:   Get lots of rest, and drink plenty of liquids.   Drink hot tea.   Suck on cough drops or hard candy.   Take over-the-counter cough and cold medicines.   Breath in warm, moist air, such as in the shower, over a kettle, or from a humidifier.   Take a pain-relieving medicine if you have cold or flu symptoms like headache, muscle aches, or joint pain.  Avoid smoking or being around others who smoke. This can make your cough worse.  How can I keep from getting bronchitis again? -- You can reduce your chance of getting bronchitis again by keeping the germs that cause bronchitis out of your body. One of the best ways to do this is to wash your hands often with soap and water. If there is no sink nearby, you can use a hand gel with alcohol in it to clean your hands.  How can I keep from spreading germs? -- In addition to washing your hands often, cover your mouth with your elbow when you sneeze or cough. Using your elbow keeps you from getting germs on your hands. If you use a tissue, throw the tissue away and wash your hands.  When should I call the doctor? -- Call for advice if you have:   A fever higher than 100.4°F (38°C), or chills   Chest pain when you cough, trouble breathing, or coughing up blood   A barking cough that makes it hard to talk   Cough and weight loss that you cannot explain   Symptoms that are not getting better after 3 weeks  All topics are updated as new evidence becomes available and our peer review process is complete.  This topic retrieved from [employer] on: May 16, 2024.  Topic 03696 Version 17.0  Release: 32.4.3 - C32.135  © 2024 UpToDate, Inc. and/or its affiliates. All rights reserved.  figure 1: Normal lungs     The lungs sit in the chest, inside the ribcage. They are covered with a thin membrane called the  "\"pleura.\" The windpipe, or trachea, branches into 2 smaller airways called the left and right \"bronchi.\" The space between the lungs is called the \"mediastinum.\" Lymph nodes are located within and around the lungs and mediastinum.  Graphic 12979 Version 14.0  Consumer Information Use and Disclaimer   Disclaimer: This generalized information is a limited summary of diagnosis, treatment, and/or medication information. It is not meant to be comprehensive and should be used as a tool to help the user understand and/or assess potential diagnostic and treatment options. It does NOT include all information about conditions, treatments, medications, side effects, or risks that may apply to a specific patient. It is not intended to be medical advice or a substitute for the medical advice, diagnosis, or treatment of a health care provider based on the health care provider's examination and assessment of a patient's specific and unique circumstances. Patients must speak with a health care provider for complete information about their health, medical questions, and treatment options, including any risks or benefits regarding use of medications. This information does not endorse any treatments or medications as safe, effective, or approved for treating a specific patient. UpToDate, Inc. and its affiliates disclaim any warranty or liability relating to this information or the use thereof.The use of this information is governed by the Terms of Use, available at https://www.[employer].com/en/know/clinical-effectiveness-terms. 2024© UpToDate, Inc. and its affiliates and/or licensors. All rights reserved.  Copyright   © 2024 UpToDate, Inc. and/or its affiliates. All rights reserved.    "

## 2025-06-12 ENCOUNTER — NURSE TRIAGE (OUTPATIENT)
Age: 75
End: 2025-06-12

## 2025-06-12 ENCOUNTER — TELEPHONE (OUTPATIENT)
Dept: INTERNAL MEDICINE CLINIC | Facility: CLINIC | Age: 75
End: 2025-06-12

## 2025-06-12 DIAGNOSIS — J44.89 COPD WITH CHRONIC BRONCHITIS (HCC): Primary | ICD-10-CM

## 2025-06-12 RX ORDER — AZITHROMYCIN 250 MG/1
250 TABLET, FILM COATED ORAL EVERY 24 HOURS
Qty: 6 TABLET | Refills: 0 | Status: SHIPPED | OUTPATIENT
Start: 2025-06-12 | End: 2025-06-17

## 2025-06-12 RX ORDER — AZITHROMYCIN 250 MG/1
250 TABLET, FILM COATED ORAL EVERY 24 HOURS
COMMUNITY
End: 2025-06-12 | Stop reason: SDUPTHER

## 2025-06-12 NOTE — TELEPHONE ENCOUNTER
Patient called to request an antibiotic. Called patient back to offer her an appointment and explained that the doctor will not prescribe without the visit.   Patient refused. I also recommended she go the UC , she refused.

## 2025-06-12 NOTE — TELEPHONE ENCOUNTER
"REASON FOR CONVERSATION: Cough    SYMPTOMS: Patient states she is a 2 pack a day 25 year smoker (suspected COPD declining pulmonary consult) and they have been painting her apartment building which she feels is triggering inflammation and causing her to have some mild shortness of breath. Denies any sputum, fever, or wheezing. Is requesting that provider call in prescription for Zithromax because she feels it is becoming bronchitis for her. Declining same day OV.     OTHER HEALTH INFORMATION: Declining same day OV with Primary Care Provider.    PROTOCOL DISPOSITION: See Today in Office    CARE ADVICE PROVIDED: Call back with worsening symptoms or if would like OV.    PRACTICE FOLLOW-UP: Please reach out to patient with provider response for possible Zithromax prescription. Patient declining same day OV.       Reason for Disposition   Known COPD or other severe lung disease (i.e., bronchiectasis, cystic fibrosis, lung surgery) and symptoms getting worse (i.e., increased sputum purulence or amount, increased breathing difficulty)    Answer Assessment - Initial Assessment Questions  1. ONSET: \"When did the cough begin?\"       Ongoing -smoker   2. SEVERITY: \"How bad is the cough today?\"       Moderate   3. SPUTUM: \"Describe the color of your sputum\" (e.g., none, dry cough; clear, white, yellow, green)      Denies   4. HEMOPTYSIS: \"Are you coughing up any blood?\" If Yes, ask: \"How much?\" (e.g., flecks, streaks, tablespoons, etc.)      Denies   5. DIFFICULTY BREATHING: \"Are you having difficulty breathing?\" If Yes, ask: \"How bad is it?\" (e.g., mild, moderate, severe)       Mild to moderate- painting apartment which   6. FEVER: \"Do you have a fever?\" If Yes, ask: \"What is your temperature, how was it measured, and when did it start?\"      Denies   7. CARDIAC HISTORY: \"Do you have any history of heart disease?\" (e.g., heart attack, congestive heart failure)       Denies   8. LUNG HISTORY: \"Do you have any history of lung " "disease?\"  (e.g., pulmonary embolus, asthma, emphysema)      2 pack  a  day 25  year smoker-suspected COPD   9. PE RISK FACTORS: \"Do you have a history of blood clots?\" (or: recent major surgery, recent prolonged travel, bedridden)      Denies   10. OTHER SYMPTOMS: \"Do you have any other symptoms?\" (e.g., runny nose, wheezing, chest pain)        Denies  11. PREGNANCY: \"Is there any chance you are pregnant?\" \"When was your last menstrual period?\"        Denies   12. TRAVEL: \"Have you traveled out of the country in the last month?\" (e.g., travel history, exposures)        Denies    Protocols used: Cough-Adult-[place]    "

## (undated) DEVICE — STOCKINETTE,IMPERVIOUS,12X48,STERILE: Brand: MEDLINE

## (undated) DEVICE — CULTURE TUBE ANAEROBIC

## (undated) DEVICE — COBAN 4 IN STERILE

## (undated) DEVICE — CULTURE TUBE AEROBIC

## (undated) DEVICE — BANDAGE, ESMARK LF STR 4"X9'(20/CS): Brand: CYPRESS

## (undated) DEVICE — GLOVE INDICATOR PI UNDERGLOVE SZ 8 BLUE

## (undated) DEVICE — PADDING CAST 4 IN  COTTON STRL

## (undated) DEVICE — ACE WRAP 4 IN STERILE

## (undated) DEVICE — GLOVE SRG BIOGEL 8

## (undated) DEVICE — DRAPE SHEET THREE QUARTER

## (undated) DEVICE — ABDOMINAL PAD: Brand: DERMACEA

## (undated) DEVICE — BASIC DOUBLE BASIN 2-LF: Brand: MEDLINE INDUSTRIES, INC.

## (undated) DEVICE — WET SKIN PREP TRAY: Brand: MEDLINE INDUSTRIES, INC.

## (undated) DEVICE — SUT ETHILON 3-0 INFS-1 30 IN 669H

## (undated) DEVICE — CURITY NON-ADHERENT STRIPS: Brand: CURITY

## (undated) DEVICE — 3M™ STERI-DRAPE™ U-DRAPE 1015: Brand: STERI-DRAPE™

## (undated) DEVICE — NEPTUNE E-SEP SMOKE EVACUATION PENCIL, COATED, 70MM BLADE, PUSH BUTTON SWITCH: Brand: NEPTUNE E-SEP

## (undated) DEVICE — ZIMMER® STERILE DISPOSABLE TOURNIQUET CUFF, DUAL PORT, SINGLE BLADDER, 18 IN. (46 CM)

## (undated) DEVICE — SPONGE GAUZE 4 X 9

## (undated) DEVICE — SUT ETHILON 3-0 FSL 30 IN 1671H

## (undated) DEVICE — INTENDED FOR TISSUE SEPARATION, AND OTHER PROCEDURES THAT REQUIRE A SHARP SURGICAL BLADE TO PUNCTURE OR CUT.: Brand: BARD-PARKER ® CARBON RIB-BACK BLADES

## (undated) DEVICE — ASTOUND SURGICAL GOWN, XXX LARGE, X-LONG: Brand: CONVERTORS

## (undated) DEVICE — TOWEL SET X-RAY

## (undated) DEVICE — GENERAL/ PLASTIC TRAY STANDARD: Brand: DEROYAL

## (undated) DEVICE — TIBURON EXTREMITY SHEET: Brand: CONVERTORS

## (undated) DEVICE — PAD CAST 4 IN COTTON NON STERILE

## (undated) DEVICE — ASTOUND STANDARD SURGICAL GOWN, XL: Brand: CONVERTORS

## (undated) DEVICE — SPONGE LAP 18 X 18 IN STRL RFD

## (undated) DEVICE — BIPOLAR CORD DISP